# Patient Record
Sex: FEMALE | Race: WHITE | HISPANIC OR LATINO | URBAN - METROPOLITAN AREA
[De-identification: names, ages, dates, MRNs, and addresses within clinical notes are randomized per-mention and may not be internally consistent; named-entity substitution may affect disease eponyms.]

---

## 2017-05-01 ENCOUNTER — OUTPATIENT (OUTPATIENT)
Dept: OUTPATIENT SERVICES | Facility: HOSPITAL | Age: 82
LOS: 1 days | Discharge: HOME | End: 2017-05-01

## 2017-06-28 DIAGNOSIS — R05 COUGH: ICD-10-CM

## 2018-10-19 ENCOUNTER — RESULT REVIEW (OUTPATIENT)
Age: 83
End: 2018-10-19

## 2018-10-19 ENCOUNTER — OUTPATIENT (OUTPATIENT)
Dept: OUTPATIENT SERVICES | Facility: HOSPITAL | Age: 83
LOS: 1 days | Discharge: HOME | End: 2018-10-19

## 2018-10-19 VITALS — SYSTOLIC BLOOD PRESSURE: 153 MMHG | DIASTOLIC BLOOD PRESSURE: 76 MMHG | RESPIRATION RATE: 18 BRPM | HEART RATE: 71 BPM

## 2018-10-19 VITALS
WEIGHT: 139.99 LBS | RESPIRATION RATE: 18 BRPM | DIASTOLIC BLOOD PRESSURE: 93 MMHG | SYSTOLIC BLOOD PRESSURE: 177 MMHG | HEIGHT: 58 IN | HEART RATE: 65 BPM | TEMPERATURE: 98 F

## 2018-10-19 DIAGNOSIS — H44.513 ABSOLUTE GLAUCOMA, BILATERAL: Chronic | ICD-10-CM

## 2018-10-19 DIAGNOSIS — Z95.0 PRESENCE OF CARDIAC PACEMAKER: Chronic | ICD-10-CM

## 2018-10-19 DIAGNOSIS — Z90.49 ACQUIRED ABSENCE OF OTHER SPECIFIED PARTS OF DIGESTIVE TRACT: Chronic | ICD-10-CM

## 2018-10-19 DIAGNOSIS — Z86.79 PERSONAL HISTORY OF OTHER DISEASES OF THE CIRCULATORY SYSTEM: Chronic | ICD-10-CM

## 2018-10-19 DIAGNOSIS — H26.9 UNSPECIFIED CATARACT: Chronic | ICD-10-CM

## 2018-10-19 DIAGNOSIS — Z90.710 ACQUIRED ABSENCE OF BOTH CERVIX AND UTERUS: Chronic | ICD-10-CM

## 2018-10-19 RX ORDER — SODIUM CHLORIDE 9 MG/ML
1000 INJECTION, SOLUTION INTRAVENOUS
Qty: 0 | Refills: 0 | Status: DISCONTINUED | OUTPATIENT
Start: 2018-10-19 | End: 2018-11-03

## 2018-10-19 RX ORDER — ONDANSETRON 8 MG/1
4 TABLET, FILM COATED ORAL ONCE
Qty: 0 | Refills: 0 | Status: DISCONTINUED | OUTPATIENT
Start: 2018-10-19 | End: 2018-11-03

## 2018-10-19 NOTE — H&P ADULT - NSHPPHYSICALEXAM_GEN_ALL_CORE
Physical Exam  Gen: NAD  HEENT: NC/AT  Cardio: S1/S2   Resp: CTA B/L  Abdomen: Soft, ND/NT  Neuro: AAOx3

## 2018-10-19 NOTE — H&P ADULT - ASSESSMENT
Patient is an 85 y/o with PMHx of HTN, CAD, Asthma, that presents to [facility] for Endoscopic work up for some abdominal pain and decrease of appetite.   - Plan EGD with biopsies

## 2018-10-19 NOTE — H&P ADULT - HISTORY OF PRESENT ILLNESS
Patient is an 83 y/o with PMHx of HTN, CAD, Asthma, that presents to [facility] for Endoscopic work up for some abdominal pain and decrease of appetite.

## 2018-10-19 NOTE — CHART NOTE - NSCHARTNOTEFT_GEN_A_CORE
PACU ANESTHESIA ADMISSION NOTE      Procedure:   Post op diagnosis:      ____  Intubated  TV:______       Rate: ______      FiO2: ______    _x___  Patent Airway    _x___  Full return of protective reflexes    _x___  Full recovery from anesthesia / back to baseline status    Vitals:            T:                BP :     123/75           R:  20            Sat: 97              P: 77      Mental Status:  _x___ Awake   _____ Alert   _____ Drowsy   _____ Sedated    Nausea/Vomiting:  _x___  NO       ______Yes,   See Post - Op Orders         Pain Scale (0-10):  __0___    Treatment: _x___ None    ____ See Post - Op/PCA Orders    Post - Operative Fluids:   __x__ Oral   ____ See Post - Op Orders    Plan: Discharge:   _x___Home       _____Floor     _____Critical Care    _____  Other:_________________    Comments:  No anesthesia issues or complications noted.  Discharge when criteria met.

## 2018-10-19 NOTE — ASU PATIENT PROFILE, ADULT - PSH
Absolute glaucoma of both eyes    Cataract, bilateral    H/O abdominal hysterectomy    History of cardiac arrhythmia  stents  History of cholecystectomy    History of pacemaker

## 2018-10-19 NOTE — CHART NOTE - NSCHARTNOTEFT_GEN_A_CORE
84y Female    for EGD    Proventil (Other)      HPI:      PAST MEDICAL & SURGICAL HISTORY:  Asthma  CAD (coronary artery disease)  Hypothyroid  HTN (hypertension)  Cataract, bilateral  Absolute glaucoma of both eyes  H/O abdominal hysterectomy  History of cholecystectomy  History of cardiac arrhythmia: stents  History of pacemaker  Hard of Hearing      MEDICATIONS  (STANDING):    MEDICATIONS  (PRN):    Home Medications:  Advair Diskus 250 mcg-50 mcg inhalation powder: 1 puff(s) inhaled once a day (19 Oct 2018 08:41)  Aspir 81 oral delayed release tablet: 1 tab(s) orally once a day (19 Oct 2018 08:41)  Crestor 20 mg oral tablet: 1 tab(s) orally once a day (at bedtime) (19 Oct 2018 08:41)  metoprolol succinate 50 mg oral tablet, extended release: 1 tab(s) orally 2 times a day (19 Oct 2018 08:41)      Allergies    Proventil (Other)    Intolerances        SOCIAL HISTORY:    NSNDND    FAMILY HISTORY:      Vital Signs Last 24 Hrs  T(C): 36.6 (19 Oct 2018 08:42), Max: 36.6 (19 Oct 2018 08:42)  T(F): 97.9 (19 Oct 2018 08:42), Max: 97.9 (19 Oct 2018 08:42)  HR: 65 (19 Oct 2018 08:42) (65 - 65)  BP: 177/93 (19 Oct 2018 08:42) (177/93 - 177/93)  BP(mean): --  RR: 18 (19 Oct 2018 08:42) (18 - 18)  SpO2: 96    NPO: _x___ Yes  ____ No    Exam:  Drug Dosing Weight  Height (cm): 147.32 (19 Oct 2018 08:42)  Weight (kg): 63.5 (19 Oct 2018 08:42)  BMI (kg/m2): 29.3 (19 Oct 2018 08:42)  BSA (m2): 1.57 (19 Oct 2018 08:42)    MP: II  Teeth:  Mouth opening:    LABS:                    RADIOLOGY & ADDITIONAL STUDIES:      ASA _III___,  R/B/A MAC discussed with: __x__ patient, ____ guardian, Understand and Accepts,  Question Answered.

## 2018-10-23 LAB — SURGICAL PATHOLOGY STUDY: SIGNIFICANT CHANGE UP

## 2018-10-24 DIAGNOSIS — K29.50 UNSPECIFIED CHRONIC GASTRITIS WITHOUT BLEEDING: ICD-10-CM

## 2018-10-24 DIAGNOSIS — K29.80 DUODENITIS WITHOUT BLEEDING: ICD-10-CM

## 2018-10-24 DIAGNOSIS — R10.13 EPIGASTRIC PAIN: ICD-10-CM

## 2019-04-03 PROBLEM — E03.9 HYPOTHYROIDISM, UNSPECIFIED: Chronic | Status: ACTIVE | Noted: 2018-10-19

## 2019-04-03 PROBLEM — I10 ESSENTIAL (PRIMARY) HYPERTENSION: Chronic | Status: ACTIVE | Noted: 2018-10-19

## 2019-04-09 ENCOUNTER — APPOINTMENT (OUTPATIENT)
Dept: CARDIOTHORACIC SURGERY | Facility: CLINIC | Age: 84
End: 2019-04-09

## 2019-04-09 VITALS — BODY MASS INDEX: 28.22 KG/M2 | HEIGHT: 59 IN | WEIGHT: 140 LBS

## 2019-04-09 VITALS
SYSTOLIC BLOOD PRESSURE: 136 MMHG | RESPIRATION RATE: 14 BRPM | HEART RATE: 68 BPM | DIASTOLIC BLOOD PRESSURE: 76 MMHG | OXYGEN SATURATION: 92 %

## 2019-04-09 DIAGNOSIS — M79.603 PAIN IN ARM, UNSPECIFIED: ICD-10-CM

## 2019-04-09 DIAGNOSIS — Z86.39 PERSONAL HISTORY OF OTHER ENDOCRINE, NUTRITIONAL AND METABOLIC DISEASE: ICD-10-CM

## 2019-04-09 DIAGNOSIS — G89.29 PAIN IN ARM, UNSPECIFIED: ICD-10-CM

## 2019-04-09 DIAGNOSIS — Z87.09 PERSONAL HISTORY OF OTHER DISEASES OF THE RESPIRATORY SYSTEM: ICD-10-CM

## 2019-04-09 RX ORDER — METOPROLOL TARTRATE 50 MG/1
50 TABLET, FILM COATED ORAL
Refills: 0 | Status: COMPLETED | OUTPATIENT
Start: 2019-04-09

## 2019-04-09 RX ORDER — ROSUVASTATIN CALCIUM 20 MG/1
20 TABLET, FILM COATED ORAL
Refills: 0 | Status: COMPLETED | OUTPATIENT
Start: 2019-04-09

## 2019-04-09 RX ORDER — LEVOTHYROXINE SODIUM 75 UG/1
75 TABLET ORAL DAILY
Refills: 0 | Status: COMPLETED | OUTPATIENT
Start: 2019-04-09

## 2019-04-09 RX ORDER — FLUTICASONE PROPIONATE AND SALMETEROL 50; 100 UG/1; UG/1
100-50 POWDER RESPIRATORY (INHALATION)
Refills: 0 | Status: COMPLETED | OUTPATIENT
Start: 2019-04-09

## 2019-04-09 NOTE — REASON FOR VISIT
[Consultation] : a consultation visit [Family Member] : family member [FreeTextEntry1] : PPM- Dr Durbin, pt's cardio Dr. Moreau/ Dr. Lopez

## 2019-04-09 NOTE — HISTORY OF PRESENT ILLNESS
[Dyslipidemia] : Dyslipidemia [FreeTextEntry1] : Pt is 85y f, [place], with PPM ( sinus pauses) 9/21/2009 [facility],  CAD s/p PCI X 2 stent (2014?)  at [facility].  Battery at [facility] since feb. 27th, 2019.  PMHx of COPD/ Asthma ,hypothyroid, HLD. HTN. [Liver Disease] : no liver disease [Diabetes Mellitus] : no Diabetes Melllitus [Home Oxygen] : no home oxygen use [Cerebrovascular Disease] : no cerebrovascular disease [Unresponsive Neurologic State] : not in a unresponsive neurologic state [Prior Myocardial Infarction] : No prior myocardial infarction [Prior Heart Failure] : no prior heart failure

## 2019-04-11 ENCOUNTER — OUTPATIENT (OUTPATIENT)
Dept: OUTPATIENT SERVICES | Facility: HOSPITAL | Age: 84
LOS: 1 days | Discharge: HOME | End: 2019-04-11
Payer: MEDICARE

## 2019-04-11 VITALS
RESPIRATION RATE: 18 BRPM | OXYGEN SATURATION: 96 % | HEART RATE: 66 BPM | HEIGHT: 58 IN | WEIGHT: 137.35 LBS | TEMPERATURE: 99 F

## 2019-04-11 DIAGNOSIS — Z95.0 PRESENCE OF CARDIAC PACEMAKER: Chronic | ICD-10-CM

## 2019-04-11 DIAGNOSIS — T82.111A BREAKDOWN (MECHANICAL) OF CARDIAC PULSE GENERATOR (BATTERY), INITIAL ENCOUNTER: ICD-10-CM

## 2019-04-11 DIAGNOSIS — Z01.818 ENCOUNTER FOR OTHER PREPROCEDURAL EXAMINATION: ICD-10-CM

## 2019-04-11 DIAGNOSIS — Z86.79 PERSONAL HISTORY OF OTHER DISEASES OF THE CIRCULATORY SYSTEM: Chronic | ICD-10-CM

## 2019-04-11 DIAGNOSIS — H26.9 UNSPECIFIED CATARACT: Chronic | ICD-10-CM

## 2019-04-11 DIAGNOSIS — Z90.710 ACQUIRED ABSENCE OF BOTH CERVIX AND UTERUS: Chronic | ICD-10-CM

## 2019-04-11 DIAGNOSIS — H44.513 ABSOLUTE GLAUCOMA, BILATERAL: Chronic | ICD-10-CM

## 2019-04-11 DIAGNOSIS — Z90.49 ACQUIRED ABSENCE OF OTHER SPECIFIED PARTS OF DIGESTIVE TRACT: Chronic | ICD-10-CM

## 2019-04-11 LAB
ALBUMIN SERPL ELPH-MCNC: 4.4 G/DL — SIGNIFICANT CHANGE UP (ref 3.5–5.2)
ALP SERPL-CCNC: 104 U/L — SIGNIFICANT CHANGE UP (ref 30–115)
ALT FLD-CCNC: 9 U/L — SIGNIFICANT CHANGE UP (ref 0–41)
ANION GAP SERPL CALC-SCNC: 11 MMOL/L — SIGNIFICANT CHANGE UP (ref 7–14)
APTT BLD: 34.2 SEC — SIGNIFICANT CHANGE UP (ref 27–39.2)
AST SERPL-CCNC: 17 U/L — SIGNIFICANT CHANGE UP (ref 0–41)
BASOPHILS # BLD AUTO: 0.05 K/UL — SIGNIFICANT CHANGE UP (ref 0–0.2)
BASOPHILS NFR BLD AUTO: 0.6 % — SIGNIFICANT CHANGE UP (ref 0–1)
BILIRUB SERPL-MCNC: 0.3 MG/DL — SIGNIFICANT CHANGE UP (ref 0.2–1.2)
BUN SERPL-MCNC: 13 MG/DL — SIGNIFICANT CHANGE UP (ref 10–20)
CALCIUM SERPL-MCNC: 10 MG/DL — SIGNIFICANT CHANGE UP (ref 8.5–10.1)
CHLORIDE SERPL-SCNC: 102 MMOL/L — SIGNIFICANT CHANGE UP (ref 98–110)
CO2 SERPL-SCNC: 27 MMOL/L — SIGNIFICANT CHANGE UP (ref 17–32)
CREAT SERPL-MCNC: 0.8 MG/DL — SIGNIFICANT CHANGE UP (ref 0.7–1.5)
EOSINOPHIL # BLD AUTO: 0.71 K/UL — HIGH (ref 0–0.7)
EOSINOPHIL NFR BLD AUTO: 7.9 % — SIGNIFICANT CHANGE UP (ref 0–8)
GLUCOSE SERPL-MCNC: 104 MG/DL — HIGH (ref 70–99)
HCT VFR BLD CALC: 43.3 % — SIGNIFICANT CHANGE UP (ref 37–47)
HGB BLD-MCNC: 13.4 G/DL — SIGNIFICANT CHANGE UP (ref 12–16)
IMM GRANULOCYTES NFR BLD AUTO: 0.3 % — SIGNIFICANT CHANGE UP (ref 0.1–0.3)
INR BLD: 1.07 RATIO — SIGNIFICANT CHANGE UP (ref 0.65–1.3)
LYMPHOCYTES # BLD AUTO: 1.92 K/UL — SIGNIFICANT CHANGE UP (ref 1.2–3.4)
LYMPHOCYTES # BLD AUTO: 21.4 % — SIGNIFICANT CHANGE UP (ref 20.5–51.1)
MCHC RBC-ENTMCNC: 25.1 PG — LOW (ref 27–31)
MCHC RBC-ENTMCNC: 30.9 G/DL — LOW (ref 32–37)
MCV RBC AUTO: 81.1 FL — SIGNIFICANT CHANGE UP (ref 81–99)
MONOCYTES # BLD AUTO: 0.81 K/UL — HIGH (ref 0.1–0.6)
MONOCYTES NFR BLD AUTO: 9 % — SIGNIFICANT CHANGE UP (ref 1.7–9.3)
NEUTROPHILS # BLD AUTO: 5.47 K/UL — SIGNIFICANT CHANGE UP (ref 1.4–6.5)
NEUTROPHILS NFR BLD AUTO: 60.8 % — SIGNIFICANT CHANGE UP (ref 42.2–75.2)
NRBC # BLD: 0 /100 WBCS — SIGNIFICANT CHANGE UP (ref 0–0)
PLATELET # BLD AUTO: 244 K/UL — SIGNIFICANT CHANGE UP (ref 130–400)
POTASSIUM SERPL-MCNC: 4.9 MMOL/L — SIGNIFICANT CHANGE UP (ref 3.5–5)
POTASSIUM SERPL-SCNC: 4.9 MMOL/L — SIGNIFICANT CHANGE UP (ref 3.5–5)
PROT SERPL-MCNC: 7.2 G/DL — SIGNIFICANT CHANGE UP (ref 6–8)
PROTHROM AB SERPL-ACNC: 12.3 SEC — SIGNIFICANT CHANGE UP (ref 9.95–12.87)
RBC # BLD: 5.34 M/UL — SIGNIFICANT CHANGE UP (ref 4.2–5.4)
RBC # FLD: 14.8 % — HIGH (ref 11.5–14.5)
SODIUM SERPL-SCNC: 140 MMOL/L — SIGNIFICANT CHANGE UP (ref 135–146)
WBC # BLD: 8.99 K/UL — SIGNIFICANT CHANGE UP (ref 4.8–10.8)
WBC # FLD AUTO: 8.99 K/UL — SIGNIFICANT CHANGE UP (ref 4.8–10.8)

## 2019-04-11 PROCEDURE — 71046 X-RAY EXAM CHEST 2 VIEWS: CPT | Mod: 26

## 2019-04-11 PROCEDURE — 93010 ELECTROCARDIOGRAM REPORT: CPT

## 2019-04-11 NOTE — H&P PST ADULT - HISTORY OF PRESENT ILLNESS
CURRENTLY  DENIES ANY CP, PALPITATIONS,COUGH OR DYSURIA  EXERCISE TOLERANCE LIMITED TO LESS THAN 1/2 BLOCK WITHOUT SOB    AS PER PATIENT  this is his/her complete medical history including medications - PRESCRIPTIONS  OVER THE COUNTER MEDS    PT STATES SHE IS ALWAYS SHORT OF BREATH- USES ADVAIR THAT HELPS HER SYMPTOMS  HAD RECENT THROAT AND RIGHT EAR INFECTION- COMPLETED ANTIBIOTICS 2 WEEKS AGO

## 2019-04-11 NOTE — H&P PST ADULT - NSICDXPASTSURGICALHX_GEN_ALL_CORE_FT
PAST SURGICAL HISTORY:  Absolute glaucoma of both eyes     Cataract, bilateral     H/O abdominal hysterectomy     History of cardiac arrhythmia stents    History of cholecystectomy     History of pacemaker

## 2019-04-11 NOTE — H&P PST ADULT - REASON FOR ADMISSION
86 Y/O F SCHEDULED FOR PAST FOR PACEMAKER BATTERY CHANGE ON 4/15/19  PACEMAKER PLACED 9 YEARS AGO- PT STATES HER HEART "STOPPED 4 TIMES".

## 2019-04-11 NOTE — H&P PST ADULT - NSICDXPASTMEDICALHX_GEN_ALL_CORE_FT
PAST MEDICAL HISTORY:  Asthma LAST ATTACK YEARS AGO    CAD (coronary artery disease) STENTS X 2    High cholesterol     HTN (hypertension)     Hypothyroid     Vertigo CAN NOT LAY FLAT

## 2019-04-15 ENCOUNTER — OUTPATIENT (OUTPATIENT)
Dept: OUTPATIENT SERVICES | Facility: HOSPITAL | Age: 84
LOS: 1 days | Discharge: HOME | End: 2019-04-15

## 2019-04-15 VITALS
TEMPERATURE: 98 F | HEIGHT: 58 IN | RESPIRATION RATE: 18 BRPM | SYSTOLIC BLOOD PRESSURE: 155 MMHG | HEART RATE: 56 BPM | WEIGHT: 145.06 LBS | DIASTOLIC BLOOD PRESSURE: 67 MMHG

## 2019-04-15 VITALS — RESPIRATION RATE: 18 BRPM | DIASTOLIC BLOOD PRESSURE: 79 MMHG | SYSTOLIC BLOOD PRESSURE: 160 MMHG | HEART RATE: 61 BPM

## 2019-04-15 DIAGNOSIS — Z90.710 ACQUIRED ABSENCE OF BOTH CERVIX AND UTERUS: Chronic | ICD-10-CM

## 2019-04-15 DIAGNOSIS — H44.513 ABSOLUTE GLAUCOMA, BILATERAL: Chronic | ICD-10-CM

## 2019-04-15 DIAGNOSIS — H26.9 UNSPECIFIED CATARACT: Chronic | ICD-10-CM

## 2019-04-15 DIAGNOSIS — Z95.0 PRESENCE OF CARDIAC PACEMAKER: Chronic | ICD-10-CM

## 2019-04-15 DIAGNOSIS — Z86.79 PERSONAL HISTORY OF OTHER DISEASES OF THE CIRCULATORY SYSTEM: Chronic | ICD-10-CM

## 2019-04-15 DIAGNOSIS — Z90.49 ACQUIRED ABSENCE OF OTHER SPECIFIED PARTS OF DIGESTIVE TRACT: Chronic | ICD-10-CM

## 2019-04-15 DIAGNOSIS — I10 ESSENTIAL (PRIMARY) HYPERTENSION: ICD-10-CM

## 2019-04-15 DIAGNOSIS — I25.10 ATHEROSCLEROTIC HEART DISEASE OF NATIVE CORONARY ARTERY WITHOUT ANGINA PECTORIS: ICD-10-CM

## 2019-04-15 DIAGNOSIS — J45.909 UNSPECIFIED ASTHMA, UNCOMPLICATED: ICD-10-CM

## 2019-04-15 PROBLEM — R42 DIZZINESS AND GIDDINESS: Chronic | Status: ACTIVE | Noted: 2019-04-11

## 2019-04-15 PROBLEM — E78.00 PURE HYPERCHOLESTEROLEMIA, UNSPECIFIED: Chronic | Status: ACTIVE | Noted: 2019-04-11

## 2019-04-15 NOTE — ASU DISCHARGE PLAN (ADULT/PEDIATRIC) - CALL YOUR DOCTOR IF YOU HAVE ANY OF THE FOLLOWING:
Excessive diarrhea/Pain not relieved by Medications/Increased irritability or sluggishness/Fever greater than (need to indicate Fahrenheit or Celsius)/Wound/Surgical Site with redness, or foul smelling discharge or pus/Numbness, tingling, color or temperature change to extremity/Nausea and vomiting that does not stop/Inability to tolerate liquids or foods/Bleeding that does not stop/Swelling that gets worse/Unable to urinate

## 2019-04-15 NOTE — ASU DISCHARGE PLAN (ADULT/PEDIATRIC) - CARE PROVIDER_API CALL
Javon Harley)  Surgery; Thoracic and Cardiac Surgery  [address] [place] [address], [facility] [address]  [place] [address]  Phone: [phone]  Fax: [phone]  Follow Up Time: 1 week

## 2019-04-15 NOTE — PRE-ANESTHESIA EVALUATION ADULT - NSANTHOSAYNRD_GEN_A_CORE
No. ALIS screening performed.  STOP BANG Legend: 0-2 = LOW Risk; 3-4 = INTERMEDIATE Risk; 5-8 = HIGH Risk/SEE TOOL

## 2019-04-15 NOTE — ASU DISCHARGE PLAN (ADULT/PEDIATRIC) - FREQUENT HAND WASHING PREVENTS THE SPREAD OF INFECTION.
Start erythromycin ophthalmic ointment 3 X a day for one week to both upper lids. Statement Selected

## 2019-04-15 NOTE — ASU PATIENT PROFILE, ADULT - PMH
Asthma  LAST ATTACK YEARS AGO  CAD (coronary artery disease)  STENTS X 2  High cholesterol    HTN (hypertension)    Hypothyroid    Vertigo  CAN NOT LAY FLAT

## 2019-04-23 ENCOUNTER — APPOINTMENT (OUTPATIENT)
Dept: CARDIOTHORACIC SURGERY | Facility: CLINIC | Age: 84
End: 2019-04-23

## 2019-04-23 VITALS
TEMPERATURE: 97.3 F | WEIGHT: 140 LBS | HEART RATE: 68 BPM | DIASTOLIC BLOOD PRESSURE: 90 MMHG | BODY MASS INDEX: 28.22 KG/M2 | RESPIRATION RATE: 12 BRPM | HEIGHT: 59 IN | SYSTOLIC BLOOD PRESSURE: 170 MMHG | OXYGEN SATURATION: 73 %

## 2019-04-25 DIAGNOSIS — I25.10 ATHEROSCLEROTIC HEART DISEASE OF NATIVE CORONARY ARTERY WITHOUT ANGINA PECTORIS: ICD-10-CM

## 2019-04-25 DIAGNOSIS — Z90.710 ACQUIRED ABSENCE OF BOTH CERVIX AND UTERUS: ICD-10-CM

## 2019-04-25 DIAGNOSIS — E03.9 HYPOTHYROIDISM, UNSPECIFIED: ICD-10-CM

## 2019-04-25 DIAGNOSIS — Z90.49 ACQUIRED ABSENCE OF OTHER SPECIFIED PARTS OF DIGESTIVE TRACT: ICD-10-CM

## 2019-04-25 DIAGNOSIS — H26.9 UNSPECIFIED CATARACT: ICD-10-CM

## 2019-04-25 DIAGNOSIS — J45.909 UNSPECIFIED ASTHMA, UNCOMPLICATED: ICD-10-CM

## 2019-04-25 DIAGNOSIS — Z88.8 ALLERGY STATUS TO OTHER DRUGS, MEDICAMENTS AND BIOLOGICAL SUBSTANCES: ICD-10-CM

## 2019-04-25 DIAGNOSIS — Z79.82 LONG TERM (CURRENT) USE OF ASPIRIN: ICD-10-CM

## 2019-04-25 DIAGNOSIS — Z45.010 ENCOUNTER FOR CHECKING AND TESTING OF CARDIAC PACEMAKER PULSE GENERATOR [BATTERY]: ICD-10-CM

## 2019-04-25 DIAGNOSIS — Z95.5 PRESENCE OF CORONARY ANGIOPLASTY IMPLANT AND GRAFT: ICD-10-CM

## 2019-04-25 DIAGNOSIS — H44.513 ABSOLUTE GLAUCOMA, BILATERAL: ICD-10-CM

## 2019-04-25 DIAGNOSIS — I49.9 CARDIAC ARRHYTHMIA, UNSPECIFIED: ICD-10-CM

## 2019-04-25 DIAGNOSIS — I10 ESSENTIAL (PRIMARY) HYPERTENSION: ICD-10-CM

## 2019-04-25 DIAGNOSIS — E78.00 PURE HYPERCHOLESTEROLEMIA, UNSPECIFIED: ICD-10-CM

## 2019-05-08 ENCOUNTER — OUTPATIENT (OUTPATIENT)
Dept: OUTPATIENT SERVICES | Facility: HOSPITAL | Age: 84
LOS: 1 days | Discharge: HOME | End: 2019-05-08
Payer: MEDICARE

## 2019-05-08 DIAGNOSIS — M79.621 PAIN IN RIGHT UPPER ARM: ICD-10-CM

## 2019-05-08 DIAGNOSIS — Z86.79 PERSONAL HISTORY OF OTHER DISEASES OF THE CIRCULATORY SYSTEM: Chronic | ICD-10-CM

## 2019-05-08 DIAGNOSIS — Z95.0 PRESENCE OF CARDIAC PACEMAKER: Chronic | ICD-10-CM

## 2019-05-08 DIAGNOSIS — Z90.710 ACQUIRED ABSENCE OF BOTH CERVIX AND UTERUS: Chronic | ICD-10-CM

## 2019-05-08 DIAGNOSIS — H26.9 UNSPECIFIED CATARACT: Chronic | ICD-10-CM

## 2019-05-08 DIAGNOSIS — Z90.49 ACQUIRED ABSENCE OF OTHER SPECIFIED PARTS OF DIGESTIVE TRACT: Chronic | ICD-10-CM

## 2019-05-08 DIAGNOSIS — H44.513 ABSOLUTE GLAUCOMA, BILATERAL: Chronic | ICD-10-CM

## 2019-05-08 DIAGNOSIS — M25.511 PAIN IN RIGHT SHOULDER: ICD-10-CM

## 2019-05-08 PROCEDURE — 73201 CT UPPER EXTREMITY W/DYE: CPT | Mod: 26,RT

## 2019-08-05 ENCOUNTER — APPOINTMENT (OUTPATIENT)
Dept: CARDIOLOGY | Facility: CLINIC | Age: 84
End: 2019-08-05

## 2020-11-12 ENCOUNTER — TRANSCRIPTION ENCOUNTER (OUTPATIENT)
Age: 85
End: 2020-11-12

## 2021-01-11 ENCOUNTER — OUTPATIENT (OUTPATIENT)
Dept: OUTPATIENT SERVICES | Facility: HOSPITAL | Age: 86
LOS: 1 days | Discharge: HOME | End: 2021-01-11
Payer: MEDICARE

## 2021-01-11 DIAGNOSIS — Z95.0 PRESENCE OF CARDIAC PACEMAKER: Chronic | ICD-10-CM

## 2021-01-11 DIAGNOSIS — M79.673 PAIN IN UNSPECIFIED FOOT: ICD-10-CM

## 2021-01-11 DIAGNOSIS — Z90.710 ACQUIRED ABSENCE OF BOTH CERVIX AND UTERUS: Chronic | ICD-10-CM

## 2021-01-11 DIAGNOSIS — H26.9 UNSPECIFIED CATARACT: Chronic | ICD-10-CM

## 2021-01-11 DIAGNOSIS — Z90.49 ACQUIRED ABSENCE OF OTHER SPECIFIED PARTS OF DIGESTIVE TRACT: Chronic | ICD-10-CM

## 2021-01-11 DIAGNOSIS — Z86.79 PERSONAL HISTORY OF OTHER DISEASES OF THE CIRCULATORY SYSTEM: Chronic | ICD-10-CM

## 2021-01-11 DIAGNOSIS — H44.513 ABSOLUTE GLAUCOMA, BILATERAL: Chronic | ICD-10-CM

## 2021-01-11 PROCEDURE — 73630 X-RAY EXAM OF FOOT: CPT | Mod: 26,50

## 2021-01-19 ENCOUNTER — OUTPATIENT (OUTPATIENT)
Dept: OUTPATIENT SERVICES | Facility: HOSPITAL | Age: 86
LOS: 1 days | Discharge: HOME | End: 2021-01-19
Payer: MEDICARE

## 2021-01-19 DIAGNOSIS — H44.513 ABSOLUTE GLAUCOMA, BILATERAL: Chronic | ICD-10-CM

## 2021-01-19 DIAGNOSIS — Z90.710 ACQUIRED ABSENCE OF BOTH CERVIX AND UTERUS: Chronic | ICD-10-CM

## 2021-01-19 DIAGNOSIS — H26.9 UNSPECIFIED CATARACT: Chronic | ICD-10-CM

## 2021-01-19 DIAGNOSIS — Z90.49 ACQUIRED ABSENCE OF OTHER SPECIFIED PARTS OF DIGESTIVE TRACT: Chronic | ICD-10-CM

## 2021-01-19 DIAGNOSIS — M79.671 PAIN IN RIGHT FOOT: ICD-10-CM

## 2021-01-19 DIAGNOSIS — Z86.79 PERSONAL HISTORY OF OTHER DISEASES OF THE CIRCULATORY SYSTEM: Chronic | ICD-10-CM

## 2021-01-19 DIAGNOSIS — Z95.0 PRESENCE OF CARDIAC PACEMAKER: Chronic | ICD-10-CM

## 2021-01-19 PROCEDURE — 73630 X-RAY EXAM OF FOOT: CPT | Mod: 26,RT

## 2021-05-19 ENCOUNTER — APPOINTMENT (OUTPATIENT)
Dept: CARDIOLOGY | Facility: CLINIC | Age: 86
End: 2021-05-19
Payer: MEDICARE

## 2021-05-19 VITALS
WEIGHT: 135 LBS | BODY MASS INDEX: 27.21 KG/M2 | DIASTOLIC BLOOD PRESSURE: 80 MMHG | OXYGEN SATURATION: 96 % | TEMPERATURE: 98 F | SYSTOLIC BLOOD PRESSURE: 149 MMHG | HEIGHT: 59 IN | HEART RATE: 69 BPM

## 2021-05-19 DIAGNOSIS — H91.90 UNSPECIFIED HEARING LOSS, UNSPECIFIED EAR: ICD-10-CM

## 2021-05-19 DIAGNOSIS — Z45.010 ENCOUNTER FOR CHECKING AND TESTING OF CARDIAC PACEMAKER PULSE GENERATOR [BATTERY]: ICD-10-CM

## 2021-05-19 DIAGNOSIS — E03.9 HYPOTHYROIDISM, UNSPECIFIED: ICD-10-CM

## 2021-05-19 PROCEDURE — 93000 ELECTROCARDIOGRAM COMPLETE: CPT | Mod: 59

## 2021-05-19 PROCEDURE — 93280 PM DEVICE PROGR EVAL DUAL: CPT

## 2021-05-19 PROCEDURE — 99214 OFFICE O/P EST MOD 30 MIN: CPT | Mod: 25

## 2021-05-19 NOTE — ASSESSMENT
[FreeTextEntry1] : 88 yo F wit history of sinus node dysfunction s/p DC PPM and HTN here to establish care in our device clinic. \par \par # Sinus node dysfunction s/p DC PPM\par - Normal functioning PPM. No events. \par - Pt does not want remote monitor.\par \par # HTN\par - BP elevated but pt admits to dietary noncompliance (ate a hotdog for dinner)\par - 2g Na diet enforced\par - Follow up with cardiologist \par \par I have also advised the patient to go to the nearest emergency room if she experiences any chest pain, dyspnea, syncope, or has any other compelling symptoms.\par \par Follow up in 4-6 mo

## 2021-05-19 NOTE — PROCEDURE
[No] : not [NSR] : normal sinus rhythm [See Device Printout] : See device printout [Pacemaker] : pacemaker [DDD] : DDD [Voltage: ___ volts] : Voltage was [unfilled] volts [Magnet Rate: ___ Ppm] : magnet rate was [unfilled] Ppm [Longevity: ___ months] : The estimated remaining battery life is [unfilled] months [Lead Imp:  ___ohms] : lead impedance was [unfilled] ohms [Sensing Amplitude ___mv] : sensing amplitude was [unfilled] mv [___V @] : [unfilled] V [___ ms] : [unfilled] ms [Programmed for Longevity] : output reprogrammed for improved battery longevity [de-identified] : [employer] [de-identified] : PM 1574 [de-identified] : [id number] [de-identified] : 4/16/19 [de-identified] : 55 [de-identified] : AutoCapture turned on in the RV [de-identified] : AP 21%\par  <1%\par Patient does not want remote transmitter\par AutoCapture turned on the in the RV\par Normal device function\par No events

## 2021-05-19 NOTE — PHYSICAL EXAM
[General Appearance - Well Developed] : well developed [Normal Appearance] : normal appearance [Well Groomed] : well groomed [General Appearance - Well Nourished] : well nourished [No Deformities] : no deformities [General Appearance - In No Acute Distress] : no acute distress [Heart Rate And Rhythm] : heart rate and rhythm were normal [Heart Sounds] : normal S1 and S2 [Murmurs] : no murmurs present [Edema] : no peripheral edema present [] : no respiratory distress [Respiration, Rhythm And Depth] : normal respiratory rhythm and effort [Exaggerated Use Of Accessory Muscles For Inspiration] : no accessory muscle use [Auscultation Breath Sounds / Voice Sounds] : lungs were clear to auscultation bilaterally [Left Infraclavicular] : left infraclavicular area [Clean] : clean [Dry] : dry [Well-Healed] : well-healed [Abdomen Soft] : soft [Nail Clubbing] : no clubbing of the fingernails

## 2021-05-19 NOTE — HISTORY OF PRESENT ILLNESS
[de-identified] : \par Cardiologist: Dr. Marrero\par \par 88 yo F with history of sinus pauses s/p [facility] [facility] DC PPM (implanted in [place] 9/2009, s/p gen change 4/15/2019 by Dr. Harley), CAD with PCI ([facility], 2014), COPD, asthma, HTN, HL, hypothyroidism here to establish care in our device clinic. She has not seen anyone from EP in many years. The patient denies any cardiovascular complaints including chest pain, dyspnea, palpitations, dizziness, lightheadedness, presyncope or syncope.

## 2021-11-24 ENCOUNTER — APPOINTMENT (OUTPATIENT)
Dept: CARDIOLOGY | Facility: CLINIC | Age: 86
End: 2021-11-24
Payer: MEDICARE

## 2021-11-24 VITALS
WEIGHT: 134 LBS | SYSTOLIC BLOOD PRESSURE: 152 MMHG | DIASTOLIC BLOOD PRESSURE: 76 MMHG | TEMPERATURE: 97.5 F | BODY MASS INDEX: 27.01 KG/M2 | HEIGHT: 59 IN | HEART RATE: 61 BPM | RESPIRATION RATE: 16 BRPM

## 2021-11-24 PROCEDURE — 93280 PM DEVICE PROGR EVAL DUAL: CPT

## 2021-11-24 NOTE — HISTORY OF PRESENT ILLNESS
[de-identified] : \par Cardiologist: Dr. Marrero\par \par 86 yo F with history of sinus pauses s/p [facility] [facility] DC PPM (implanted in [place] 9/2009, s/p gen change on 4/15/2019 by Dr. Harley), CAD with PCI ([facility], 2014), COPD, asthma, HTN, HL, hypothyroidism. \par \par \par  The patient denies any cardiovascular complaints including chest pain, dyspnea, palpitations, dizziness, lightheadedness, presyncope or syncope. \par \par Presents with her son for routine device interrogation.\par ECG ( 11/24/2021)  58bpm sinus abby HI 214ms QRS 82ms\par ECHO (11/4/2021) EF 50-55%

## 2021-11-24 NOTE — HISTORY OF PRESENT ILLNESS
[de-identified] : \par Cardiologist: Dr. Marrero\par \par 86 yo F with history of sinus pauses s/p [facility] [facility] DC PPM (implanted in [place] 9/2009, s/p gen change on 4/15/2019 by Dr. Harley), CAD with PCI ([facility], 2014), COPD, asthma, HTN, HL, hypothyroidism. \par \par \par  The patient denies any cardiovascular complaints including chest pain, dyspnea, palpitations, dizziness, lightheadedness, presyncope or syncope. \par \par Presents with her son for routine device interrogation.\par ECG ( 11/24/2021)  58bpm sinus abby MT 214ms QRS 82ms\par ECHO (11/4/2021) EF 50-55%

## 2021-11-24 NOTE — ASSESSMENT
[FreeTextEntry1] : # Sinus node dysfunction s/p PPM implant in 2019\par Routine dual chamber pacemaker interrogation\par Stable parameter\par No events\par \par Patient refuse remote monitoring. Given one before but never plugged it as per her son.\par RTO in 6 months

## 2021-11-24 NOTE — PROCEDURE
[No] : not [See Device Printout] : See device printout [Pacemaker] : pacemaker [DDD] : DDD [Voltage: ___ volts] : Voltage was [unfilled] volts [Magnet Rate: ___ Ppm] : magnet rate was [unfilled] Ppm [Longevity: ___ months] : The estimated remaining battery life is [unfilled] months [___V @] : [unfilled] V [___ ms] : [unfilled] ms [Programmed for Longevity] : output reprogrammed for improved battery longevity [Sinus Bradycardia] : sinus bradycardia [Sensing Amplitude ___mv] : sensing amplitude was [unfilled] mv [Lead Imp:  ___ohms] : lead impedance was [unfilled] ohms [de-identified] : Abbott  Assurity MTI [de-identified] : PM 7784 [de-identified] : [id number] [de-identified] : 4/16/19 [de-identified] : 55 [de-identified] : AutoCapture turned on in the RV [de-identified] : AP 22%\par  <1%\par Patient does not want remote transmitter\par AutoCapture turned on the in the RV\par Normal device function\par No events

## 2021-11-24 NOTE — PROCEDURE
[No] : not [See Device Printout] : See device printout [Pacemaker] : pacemaker [DDD] : DDD [Voltage: ___ volts] : Voltage was [unfilled] volts [Magnet Rate: ___ Ppm] : magnet rate was [unfilled] Ppm [Longevity: ___ months] : The estimated remaining battery life is [unfilled] months [___V @] : [unfilled] V [___ ms] : [unfilled] ms [Programmed for Longevity] : output reprogrammed for improved battery longevity [Sinus Bradycardia] : sinus bradycardia [Sensing Amplitude ___mv] : sensing amplitude was [unfilled] mv [Lead Imp:  ___ohms] : lead impedance was [unfilled] ohms [de-identified] : Abbott  Assurity MTI [de-identified] : PM 2078 [de-identified] : [id number] [de-identified] : 4/16/19 [de-identified] : 55 [de-identified] : AutoCapture turned on in the RV [de-identified] : AP 22%\par  <1%\par Patient does not want remote transmitter\par AutoCapture turned on the in the RV\par Normal device function\par No events

## 2021-12-06 ENCOUNTER — TRANSCRIPTION ENCOUNTER (OUTPATIENT)
Age: 86
End: 2021-12-06

## 2022-05-11 ENCOUNTER — APPOINTMENT (OUTPATIENT)
Dept: CARDIOLOGY | Facility: CLINIC | Age: 87
End: 2022-05-11
Payer: MEDICARE

## 2022-05-11 VITALS
HEART RATE: 55 BPM | HEIGHT: 59 IN | TEMPERATURE: 97.8 F | DIASTOLIC BLOOD PRESSURE: 77 MMHG | BODY MASS INDEX: 27.21 KG/M2 | SYSTOLIC BLOOD PRESSURE: 177 MMHG | WEIGHT: 135 LBS

## 2022-05-11 PROCEDURE — 93000 ELECTROCARDIOGRAM COMPLETE: CPT | Mod: 59

## 2022-05-11 PROCEDURE — 93280 PM DEVICE PROGR EVAL DUAL: CPT

## 2022-05-11 NOTE — PROCEDURE
[No] : not [Sinus Bradycardia] : sinus bradycardia [See Device Printout] : See device printout [Pacemaker] : pacemaker [DDD] : DDD [Voltage: ___ volts] : Voltage was [unfilled] volts [Magnet Rate: ___ Ppm] : magnet rate was [unfilled] Ppm [___V @] : [unfilled] V [___ ms] : [unfilled] ms [Programmed for Longevity] : output reprogrammed for improved battery longevity [Longevity: ___ months] : The estimated remaining battery life is [unfilled] months [Threshold Testing Performed] : Threshold testing was performed [Sensing Amplitude ___mv] : sensing amplitude was [unfilled] mv [Lead Imp:  ___ohms] : lead impedance was [unfilled] ohms [de-identified] : Abbott  Assurity MTI [de-identified] : PM 6138 [de-identified] : [id number] [de-identified] : 4/16/19 [de-identified] : 55 [de-identified] : AutoCapture turned on in the RV [de-identified] : AP 21%\par  <1%\par Patient does not want remote transmitter\par AutoCapture turned on the in the RV\par Normal device function\par No events

## 2022-05-11 NOTE — HISTORY OF PRESENT ILLNESS
[de-identified] : \par Cardiologist: Dr. Marrero\par \par 87 yo F with history of sinus pauses s/p [facility] [facility] DC PPM (implanted in [place] 9/2009, s/p gen change on 4/15/2019 by Dr. Harley), CAD with PCI ([facility], 2014), COPD, asthma, HTN, HL, hypothyroidism. \par \par \par  The patient denies any cardiovascular complaints including chest pain, dyspnea, palpitations, dizziness, lightheadedness, presyncope or syncope. \par \par Presents with her son for routine device interrogation.\par ECG ( 5/11/2022) 55 bpm A paced WI 220ms, QRS 86ms, QTC 420ms\par ECG ( 11/24/2021)  58bpm sinus abby WI 214ms QRS 82ms\par ECHO (11/4/2021) EF 50-55%

## 2022-05-11 NOTE — ASSESSMENT
[FreeTextEntry1] : # Sinus node dysfunction s/p PPM implant in 2019\par Routine dual chamber pacemaker interrogation\par Stable parameter\par No events\par \par Patient refused remote monitoring services. Prefers to come to the office\par \par HTN-\par no hypertensive med- will see cards next month\par Patient has not taken her metoprolol yet\par BW was done in [facility] - will follow\par \par RTO in 6 months

## 2022-10-11 ENCOUNTER — APPOINTMENT (OUTPATIENT)
Dept: CARDIOLOGY | Facility: CLINIC | Age: 87
End: 2022-10-11

## 2022-10-11 VITALS — BODY MASS INDEX: 26.08 KG/M2 | HEIGHT: 59 IN | WEIGHT: 129.38 LBS

## 2022-10-11 DIAGNOSIS — I73.9 PERIPHERAL VASCULAR DISEASE, UNSPECIFIED: ICD-10-CM

## 2022-10-11 PROCEDURE — 99215 OFFICE O/P EST HI 40 MIN: CPT

## 2022-10-12 ENCOUNTER — NON-APPOINTMENT (OUTPATIENT)
Age: 87
End: 2022-10-12

## 2022-10-12 DIAGNOSIS — Z82.49 FAMILY HISTORY OF ISCHEMIC HEART DISEASE AND OTHER DISEASES OF THE CIRCULATORY SYSTEM: ICD-10-CM

## 2022-10-17 ENCOUNTER — APPOINTMENT (OUTPATIENT)
Dept: CARDIOLOGY | Facility: CLINIC | Age: 87
End: 2022-10-17

## 2022-10-17 PROCEDURE — 93306 TTE W/DOPPLER COMPLETE: CPT

## 2022-11-23 ENCOUNTER — APPOINTMENT (OUTPATIENT)
Dept: CARDIOLOGY | Facility: CLINIC | Age: 87
End: 2022-11-23

## 2022-11-23 VITALS
SYSTOLIC BLOOD PRESSURE: 134 MMHG | HEART RATE: 72 BPM | HEIGHT: 62 IN | DIASTOLIC BLOOD PRESSURE: 71 MMHG | WEIGHT: 130 LBS | BODY MASS INDEX: 23.92 KG/M2 | TEMPERATURE: 97.2 F

## 2022-11-23 PROCEDURE — 93000 ELECTROCARDIOGRAM COMPLETE: CPT | Mod: 59

## 2022-11-23 PROCEDURE — 93280 PM DEVICE PROGR EVAL DUAL: CPT

## 2022-11-23 RX ORDER — ROSUVASTATIN CALCIUM 20 MG/1
20 TABLET, FILM COATED ORAL
Refills: 0 | Status: COMPLETED | COMMUNITY
End: 2022-11-23

## 2022-11-23 RX ORDER — AMLODIPINE BESYLATE 5 MG/1
5 TABLET ORAL
Refills: 0 | Status: COMPLETED | COMMUNITY
End: 2022-11-23

## 2022-11-23 NOTE — CARDIOLOGY SUMMARY
[de-identified] : ECG ( 11/23/2022) 72 bpm MI 192ms, QRS 86ms, QTC 418ms\par ECG ( 11/24/2021)  58bpm sinus abby MI 214ms QRS 82ms [de-identified] : Echo ( 10/17/2022)  EF 55%\par ECHO (11/4/2021) EF 50-55%

## 2022-11-23 NOTE — PROCEDURE
[No] : not [Sinus Bradycardia] : sinus bradycardia [See Device Printout] : See device printout [Pacemaker] : pacemaker [DDD] : DDD [Voltage: ___ volts] : Voltage was [unfilled] volts [Magnet Rate: ___ Ppm] : magnet rate was [unfilled] Ppm [___ ms] : [unfilled] ms [Programmed for Longevity] : output reprogrammed for improved battery longevity [Longevity: ___ months] : The estimated remaining battery life is [unfilled] months [Lead Imp:  ___ohms] : lead impedance was [unfilled] ohms [Sensing Amplitude ___mv] : sensing amplitude was [unfilled] mv [___V @] : [unfilled] V [de-identified] : Abbott  Assurity MTI [de-identified] : PM 2522 [de-identified] : [id number] [de-identified] : 4/16/19 [de-identified] : 55 [de-identified] : AutoCapture turned on in the RV [de-identified] : AP 16%\par  <1%\par Patient does not want remote transmitter\par AutoCapture turned on the in the RV\par Normal device function\par No events

## 2022-11-23 NOTE — ASSESSMENT
[FreeTextEntry1] : \par # Sinus node dysfunction s/p PPM implant in 2019\par Routine dual chamber pacemaker interrogation\par Stable parameter. Not pace make dependent\par No events\par \par continue metoprolol succ 50mg twice daily\par \par Patient refuse remote monitoring. Given one before but never plugged it as per her son.\par RTO in 6 months

## 2022-11-23 NOTE — HISTORY OF PRESENT ILLNESS
[de-identified] : \par Cardiologist: Dr. Marrero\par \par 87 yo F with history of sinus pauses s/p [employer] [facility] DC PPM (implanted in [place] 9/2009, s/p gen change on 4/15/2019 by Dr. Harley), CAD with PCI ([facility], 2014), COPD, asthma, HTN, HL, hypothyroidism. \par \par \par  The patient denies any cardiovascular complaints including chest pain, dyspnea, palpitations, dizziness, lightheadedness, presyncope or syncope. \par \par Presents with her son for routine device interrogation.\par

## 2022-12-13 RX ORDER — METOPROLOL SUCCINATE 50 MG/1
50 TABLET, EXTENDED RELEASE ORAL
Qty: 90 | Refills: 3 | Status: DISCONTINUED | COMMUNITY
Start: 1900-01-01 | End: 2022-12-13

## 2023-02-07 NOTE — ASU PREOP CHECKLIST - LATEX ALLERGY
[facility]  Progress Note Ching yDson 1950, 67 y o  female MRN: [id number]  Unit/Bed#: [facility] 264-39 Encounter: [id number]  Primary Care Provider: No primary care provider on file  Date and time admitted to hospital: 1/23/2023  1:33 PM    Adjustment disorder with anxiety  Assessment & Plan  Has been receiving Atarax 10mg Q6 PRN since 1/26  Discussed with patient and admits to feelings of anxiety prior to her fall  Started Lexapro 5mg daily on 1/30  Supportive counseling as needed  Neuropsych consulted  Follow-up with PCP as outpatient  Hypoxemia  Assessment & Plan  Hypoxia in acute setting post-operatively  Developed PEs and was started on Eliquis  Per Pulmonary - recommending overnight noc ox as outpatient  Desat while sleeping - noc ox obtained on 1/27  Desat for 1 hour and 40 minutes, lowest saturation 80%  Apply 2L O2 at night or while sleeping  Repeat overnight noc ox prior to discharge for DME evaluation  Osteopenia  Assessment & Plan  DXA scan in 12/2020 showed osteopenia  Continue home calcium carbonate and Vitamin D supplementation  Vitamin D level 29 8 on 1/24  Increased Vitamin D3 to 3000u daily  Recommend repeat DXA scan as outpatient along with discussion about Prolia injections  Follow-up with PCP as outpatient  Acute postoperative anemia due to expected blood loss  Assessment & Plan  Hgb currently 9 1  Hgb was 12 6 pre-operatively  Iron panel on 1/23: Iron Saturation 9%, TIBC 279, Iron 25, and Ferritin 81   2 doses of IV Venofer from 1/24-1/25  Given 1u PRBCs on 1/26 for Hgb of 6 9  No active s/s of bleeding  Transfuse for Hgb <7 0 or if becomes symptomatic  Continue to trend CBC  Acute deep vein thrombosis (DVT) of left peroneal vein [facility])  Assessment & Plan  Lower extremity venous duplex on 1/20: Evidence of focal acute DVT in 1 of 2 peroneal veins on LLE    Completed Eliquis 10mg BID for 7 days and now receiving Eliquis 5mg BID  Follow-up with PCP as outpatient  Neurovascular checks Q shift  Multiple subsegmental pulmonary emboli without acute cor pulmonale (HCC)  Assessment & Plan  CTA PE study on 1/20: Few subsegmental pulmonary emboli in the posterior lower lobes, measured RV/LV ratio within normal limits  Completed Eliquis 10mg BID for 7 days and now receiving Eliquis 5mg BID, continue for 3 months  Follow-up with PCP as outpatient  Currently maintaining on RA  Encourage pulmonary toileting  Spot pulse ox checks  Chronic diastolic congestive heart failure (HCC)  Assessment & Plan  Wt Readings from Last 3 Encounters:   02/07/23 93 kg (205 lb 0 4 oz)   01/23/23 95 2 kg (209 lb 14 1 oz)     Stable without exacerbation   on 1/20  Last ECHO on 12/2/22 showed EF 55-59%, G1DD  Takes Lasix 20mg daily at home - currently on hold  Restart as able  Other hyperlipidemia  Assessment & Plan  Continue home Lipitor 20mg daily  Last lipid panel on 11/15/22: Cholesterol 147, Triglycerides 73, HDL 53, and LDL 79  Essential hypertension  Assessment & Plan  Home regimen: Lasix 20mg daily, metoprolol tartrate 50mg BID, losartan 50mg daily  Currently receiving metoprolol tartrate 50mg BID and losartan 25mg daily  Increase losartan to 50mg daily on 2/7  Apply abdominal binder/TEDs when getting OOB  Consider restarting Lasix when able  Monitor BP with routine VS   Follow-up with PCP as outpatient  * Closed left hip fracture [facility])  Assessment & Plan  S/p mechanical fall on 1/18  XR on 1/18 showed acute intertrochanteric L hip fracture  OR on 1/19 for IM fixation of L subtrochanteric hip fracture performed by Dr Felix Montoya  WBAT to LLE  Neurovascular checks Q shift  Ensure adequate pain control  Monitor incision for s/s of infection  Eliquis for DVT ppx  Follow-up with Orthopedics in 2 weeks   XRs on 2/2: stable appearing comminuted intertrochanteric fracture s/p ORIF, no evidence for hardware complication  Ortho consulted - staples removed  Follow-up in additional 4 weeks as outpatient  Primary team following  PT/OT  VTE Pharmacologic Prophylaxis:   Pharmacologic: Apixaban (Eliquis)  Mechanical VTE Prophylaxis in Place: No - DVT  Current Length of Stay: 15 day(s)    Current Patient Status: Inpatient Rehab     Discharge Plan: As per primary team     Code Status: Level 1 - Full Code    Subjective:   Pt examined while pt sitting in recliner in pt room  Complaints of L hip pain, 6/10, aching/throbbing, worse after therapy  Improving with ice and rest   Had taken Tylenol prior to therapy but is wondering if she should also take Robaxin due to RLE being used more than she is used to  Will place PRN that she can use as needed  Denies any lower extremity edema  Denies any lightheadedness or dizziness today but states that she does occasionally experience lightheadedness if she gets up too quickly  LBM was on , denies any abdominal pain and is passing gas  Objective:     Vitals:   Temp (24hrs), Av 4 °F (36 9 °C), Min:98 1 °F (36 7 °C), Max:98 7 °F (37 1 °C)    Temp:  [98 1 °F (36 7 °C)-98 7 °F (37 1 °C)] 98 1 °F (36 7 °C)  HR:  [64-78] 78  Resp:  [18] 18  BP: (128-154)/(60-74) 148/74  SpO2:  [92 %-95 %] 95 %  Body mass index is 36 32 kg/m²  Review of Systems   Constitutional: Negative for appetite change, chills, fatigue and fever  HENT: Negative for trouble swallowing  Eyes: Negative for visual disturbance  Respiratory: Negative for cough, chest tightness, shortness of breath, wheezing and stridor  Cardiovascular: Negative for chest pain, palpitations and leg swelling  Gastrointestinal: Negative for abdominal distention, abdominal pain, constipation, diarrhea, nausea and vomiting  LBM /, passing gas   Genitourinary: Negative for difficulty urinating     Musculoskeletal: Positive for arthralgias (L hip pain, aching/throbbing, 6/10 after completing therapy session, improves with ice and rest)  Negative for back pain and gait problem  Neurological: Negative for dizziness, weakness, light-headedness, numbness and headaches  Psychiatric/Behavioral: Negative for dysphoric mood and sleep disturbance  The patient is not nervous/anxious  All other systems reviewed and are negative  Input and Output Summary (last 24 hours): Intake/Output Summary (Last 24 hours) at 2/7/2023 0903  Last data filed at 2/7/2023 0740  Gross per 24 hour   Intake 240 ml   Output 900 ml   Net -660 ml       Physical Exam:     Physical Exam  Vitals and nursing note reviewed  Constitutional:       General: She is not in acute distress  Appearance: Normal appearance  She is obese  She is not ill-appearing  HENT:      Head: Normocephalic and atraumatic  Cardiovascular:      Rate and Rhythm: Normal rate and regular rhythm  Pulses: Normal pulses  Heart sounds: Murmur heard  Systolic murmur is present with a grade of 2/6  No friction rub  Pulmonary:      Effort: Pulmonary effort is normal  No respiratory distress  Breath sounds: Normal breath sounds  No wheezing or rhonchi  Abdominal:      General: Abdomen is flat  Bowel sounds are normal  There is no distension  Palpations: Abdomen is soft  There is no mass  Tenderness: There is no abdominal tenderness  There is no guarding or rebound  Hernia: No hernia is present  Musculoskeletal:      Cervical back: Normal range of motion and neck supple  No tenderness  Right lower leg: No edema  Left lower leg: No edema  Skin:     General: Skin is warm and dry  Findings: Bruising (Generalized ecchymosis to L thigh) present  Comments: L hip incisions, well-approximated, healing  No erythema, drainage, or edema present  Neurological:      Mental Status: She is alert and oriented to person, place, and time     Psychiatric:         Mood and Affect: Mood normal          Behavior: Behavior normal          Additional Data:     Labs:    Results from last 7 days   Lab Units 02/06/23  0459   WBC Thousand/uL 4 62   HEMOGLOBIN g/dL 9 1*   HEMATOCRIT % 29 9*   PLATELETS Thousands/uL 305   NEUTROS PCT % 70   LYMPHS PCT % 16   MONOS PCT % 11   EOS PCT % 2     Results from last 7 days   Lab Units 02/06/23  0459   SODIUM mmol/L 140   POTASSIUM mmol/L 3 9   CHLORIDE mmol/L 103   CO2 mmol/L 31   BUN mg/dL 16   CREATININE mg/dL 0 54*   ANION GAP mmol/L 6   CALCIUM mg/dL 8 8   GLUCOSE RANDOM mg/dL 101*                       Labs reviewed    Imaging:    Imaging reviewed    Recent Cultures (last 7 days):           Last 24 Hours Medication List:   Current Facility-Administered Medications   Medication Dose Route Frequency Provider Last Rate   • acetaminophen  650 mg Oral Q6H PRN ELDA Yang     • apixaban  5 mg Oral BID ELDA Yang     • atorvastatin  20 mg Oral Daily With Mick Lopez MD     • bisacodyl  10 mg Rectal Daily PRN Hellen Schmidt MD     • calcium carbonate  1,000 mg Oral Daily PRN Hellen Schmidt MD     • calcium carbonate-vitamin D  2 tablet Oral Daily With Breakfast ELDA Yang     • cholecalciferol  3,000 Units Oral Daily ELDA Yang     • docusate sodium  100 mg Oral BID Hellen Schmidt MD     • escitalopram  5 mg Oral Daily JacquelineELDA Betancourt     • hydrOXYzine HCL  10 mg Oral Q6H PRN Hellen Schmidt MD     • loratadine  10 mg Oral Daily Hellen Schmidt MD     • [START ON 2/8/2023] losartan  50 mg Oral Daily JacquelineELDA Betancourt     • menthol-methyl salicylate   Apply externally 4x Daily PRN Hellen Schmidt MD     • metoprolol tartrate  50 mg Oral BID Hellen Schmidt MD     • oxyCODONE  5 mg Oral Q6H PRN Hellen Schmidt MD     • oxyCODONE  2 5 mg Oral Q6H PRN Hellen Schmidt MD     • pantoprazole  40 mg Oral Daily Hellen Schmidt MD     • polyethylene glycol  17 g Oral Daily PRN Hellen Schmidt MD          M*Modal software was used to dictate this note    It may contain errors with dictating incorrect words or incorrect spelling  Please contact the provider directly with any questions  no

## 2023-02-09 NOTE — HISTORY OF PRESENT ILLNESS
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 \par \par In past, pt was noncompliant with statins, but became compliant in 2021. PT RESISTANT TO HIGH DOSE STATINS. \par \par pt complains of pain on left side usually with lying on it around PPM area, pt has had it before and it went away, no signs of infection/fluid on exam. \par a1c 6.4 to 6.3 now  GFR 70  LDL 93 ant  \par \par 2/10/23: \par Mod restricted aortic valve mobility, mod aortic valve sclerosis, mild aortic regurgitation, mild tricuspid regurgitation, mod mitral annular calcification, degenerative mitral valve, mild LAE, EF: 55%, Grade I DD.

## 2023-02-09 NOTE — DISCUSSION/SUMMARY
[FreeTextEntry1] : pt A1c is improved, diet control for predm \par pt on statin and LDL 97 does not want higher dose \par get 2 decho \par cp seems atypical \par if not improved, f/u with dr. wheeler \par does not seem ischemic at this time, not like ischemic symptoms \par vss

## 2023-02-09 NOTE — CARDIOLOGY SUMMARY
[de-identified] : 10/17/22: Mod restricted aortic valve mobility, mod aortic valve sclerosis, mild aortic regurgitation, mild tricuspid regurgitation, mod mitral annular calcification, degenerative mitral valve, mild LAE, EF: 55%, Grade I DD.

## 2023-02-09 NOTE — PHYSICAL EXAM
[Normal S1, S2] : normal S1, S2 [Murmur] : murmur [Clear Lung Fields] : clear lung fields [Soft] : abdomen soft [de-identified] : richard

## 2023-02-10 ENCOUNTER — APPOINTMENT (OUTPATIENT)
Dept: CARDIOLOGY | Facility: CLINIC | Age: 88
End: 2023-02-10
Payer: MEDICARE

## 2023-02-10 VITALS — DIASTOLIC BLOOD PRESSURE: 70 MMHG | HEART RATE: 70 BPM | SYSTOLIC BLOOD PRESSURE: 120 MMHG

## 2023-02-10 VITALS — HEIGHT: 62 IN | BODY MASS INDEX: 23.93 KG/M2 | WEIGHT: 130.06 LBS

## 2023-02-10 PROCEDURE — 99214 OFFICE O/P EST MOD 30 MIN: CPT

## 2023-04-27 ENCOUNTER — RX RENEWAL (OUTPATIENT)
Age: 88
End: 2023-04-27

## 2023-04-28 ENCOUNTER — APPOINTMENT (OUTPATIENT)
Dept: ELECTROPHYSIOLOGY | Facility: CLINIC | Age: 88
End: 2023-04-28
Payer: MEDICARE

## 2023-04-28 VITALS
BODY MASS INDEX: 23.61 KG/M2 | WEIGHT: 128.31 LBS | HEIGHT: 62 IN | DIASTOLIC BLOOD PRESSURE: 69 MMHG | TEMPERATURE: 98 F | SYSTOLIC BLOOD PRESSURE: 167 MMHG

## 2023-04-28 VITALS — HEART RATE: 55 BPM

## 2023-04-28 DIAGNOSIS — I45.5 OTHER SPECIFIED HEART BLOCK: ICD-10-CM

## 2023-04-28 PROCEDURE — 93000 ELECTROCARDIOGRAM COMPLETE: CPT | Mod: 59

## 2023-04-28 PROCEDURE — 93280 PM DEVICE PROGR EVAL DUAL: CPT

## 2023-04-28 RX ORDER — ROSUVASTATIN CALCIUM 20 MG/1
20 TABLET, FILM COATED ORAL DAILY
Refills: 0 | Status: COMPLETED | COMMUNITY
End: 2023-04-28

## 2023-04-28 RX ORDER — EMPAGLIFLOZIN 10 MG/1
10 TABLET, FILM COATED ORAL DAILY
Qty: 30 | Refills: 5 | Status: COMPLETED | COMMUNITY
Start: 2023-02-10 | End: 2023-04-28

## 2023-04-28 NOTE — HISTORY OF PRESENT ILLNESS
[de-identified] : \par Cardiologist: Dr. Marrero\par \par 89 yo F with history of sinus pauses s/p [employer] DC PPM (implanted in [place] 9/2009, s/p gen change on 4/15/2019 by Dr. Harley), CAD with PCI ([facility], 2014), COPD, asthma, HTN, HL, hypothyroidism. \par \par \par  The patient  complained that she had  chest pain for 3 days , she has dyspnea, no palpitations, dizziness, lightheadedness, presyncope or syncope. \par \par Presents with her son for routine device interrogation.\par

## 2023-04-28 NOTE — ASSESSMENT
[FreeTextEntry1] : 89 years old female walks with a rollator, her strides  stable and fast. \par # Sinus node dysfunction s/p PPM implant in 2019\par Routine dual chamber pacemaker interrogation\par Stable parameter. Not dependent\par No events\par \par continue metoprolol succ 50mg twice daily\par \par Son said that chest pain occurs at night, he remembers giving her banana before going to bed.Pain was gone in the morning.\par I advised son to avoid giving patient something to eat  before going to bed .\par I advised him to get an over the counter PPI like omeprazole for hyper acidity.\par \par Patient refuse remote monitoring. Given one before but never plugged it as per her son.\par RTO in 6 months\par

## 2023-04-28 NOTE — REVIEW OF SYSTEMS
[Dyspnea on exertion] : dyspnea during exertion [Chest Discomfort] : chest discomfort [Negative] : Respiratory [Lower Ext Edema] : no extremity edema [Syncope] : no syncope [Wheezing] : no wheezing

## 2023-04-28 NOTE — PROCEDURE
[No] : not [Sinus Bradycardia] : sinus bradycardia [See Device Printout] : See device printout [Pacemaker] : pacemaker [DDD] : DDD [Magnet Rate: ___ Ppm] : magnet rate was [unfilled] Ppm [___V @] : [unfilled] V [___ ms] : [unfilled] ms [Programmed for Longevity] : output reprogrammed for improved battery longevity [Voltage: ___ volts] : Voltage was [unfilled] volts [Longevity: ___ months] : The estimated remaining battery life is [unfilled] months [Sensing Amplitude ___mv] : sensing amplitude was [unfilled] mv [Lead Imp:  ___ohms] : lead impedance was [unfilled] ohms [de-identified] : [employer]  Assurity MTI [de-identified] : PM 7160 [de-identified] : [id number] [de-identified] : 4/16/19 [de-identified] : 55 [de-identified] : AP 16%\par  <1%\par Patient does not want remote transmitter\par \par Normal device function\par 1 episode of svt for 2 secs on 1/8/2023\par No events

## 2023-04-28 NOTE — CARDIOLOGY SUMMARY
[de-identified] : \par Echo ( 10/17/2022)  EF 55%\par ECHO (11/4/2021) EF 50-55% [de-identified] : ECG ( 4/28/2023) 55 bpm A paced  IA 262ms, QRS 84ms, QTC 408ms\par ECG ( 11/23/2022) 72 bpm IA 192ms, QRS 86ms, QTC 418ms\par ECG ( 11/24/2021)  58bpm sinus abby IA 214ms QRS 82ms [de-identified] : 4/15/2019 [employer] dual PPM

## 2023-05-01 ENCOUNTER — OUTPATIENT (OUTPATIENT)
Dept: OUTPATIENT SERVICES | Facility: HOSPITAL | Age: 88
LOS: 1 days | End: 2023-05-01
Payer: MEDICARE

## 2023-05-01 DIAGNOSIS — Z90.710 ACQUIRED ABSENCE OF BOTH CERVIX AND UTERUS: Chronic | ICD-10-CM

## 2023-05-01 DIAGNOSIS — R06.02 SHORTNESS OF BREATH: ICD-10-CM

## 2023-05-01 DIAGNOSIS — R91.8 OTHER NONSPECIFIC ABNORMAL FINDING OF LUNG FIELD: ICD-10-CM

## 2023-05-01 DIAGNOSIS — Z95.0 PRESENCE OF CARDIAC PACEMAKER: Chronic | ICD-10-CM

## 2023-05-01 DIAGNOSIS — H44.513 ABSOLUTE GLAUCOMA, BILATERAL: Chronic | ICD-10-CM

## 2023-05-01 DIAGNOSIS — Z90.49 ACQUIRED ABSENCE OF OTHER SPECIFIED PARTS OF DIGESTIVE TRACT: Chronic | ICD-10-CM

## 2023-05-01 DIAGNOSIS — Z86.79 PERSONAL HISTORY OF OTHER DISEASES OF THE CIRCULATORY SYSTEM: Chronic | ICD-10-CM

## 2023-05-01 DIAGNOSIS — H26.9 UNSPECIFIED CATARACT: Chronic | ICD-10-CM

## 2023-05-01 PROCEDURE — 71046 X-RAY EXAM CHEST 2 VIEWS: CPT | Mod: 26

## 2023-05-01 PROCEDURE — 71046 X-RAY EXAM CHEST 2 VIEWS: CPT

## 2023-05-02 DIAGNOSIS — R06.02 SHORTNESS OF BREATH: ICD-10-CM

## 2023-05-02 DIAGNOSIS — R91.8 OTHER NONSPECIFIC ABNORMAL FINDING OF LUNG FIELD: ICD-10-CM

## 2023-06-05 NOTE — DISCUSSION/SUMMARY
[FreeTextEntry1] : pt A1c is improved, diet control for predm \par pt on statin and LDL 97 does not want higher dose \par get 2 decho \par cp seems atypical \par if not improved, f/u with dr. wheeler \par does not seem ischemic at this time, not like ischemic symptoms \par vss \par \par 2/10/23: pt increasing a1c, will start jardiance\par if expensive pt to decide \par get bloodwork f/u in 4 months

## 2023-06-05 NOTE — CARDIOLOGY SUMMARY
[de-identified] : 10/17/22: Mod restricted aortic valve mobility, mod aortic valve sclerosis, mild aortic regurgitation, mild tricuspid regurgitation, mod mitral annular calcification, degenerative mitral valve, mild LAE, EF: 55%, Grade I DD.

## 2023-06-05 NOTE — HISTORY OF PRESENT ILLNESS
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 \par STATIN MYALGIA ON HIGHER DOSES OF STATIN. \par \par In past, pt was noncompliant with statins, but became compliant in . PT RESISTANT TO HIGH DOSE STATINS. \par \par pt complains of pain on left side usually with lying on it around PPM area, pt has had it before and it went away, no signs of infection/fluid on exam. \par a1c 6.4 to 6.3 now  GFR 70  LDL 93 ant  \par \par 2/10/23: \par Mod restricted aortic valve mobility, mod aortic valve sclerosis, mild aortic regurgitation, mild tricuspid regurgitation, mod mitral annular calcification, degenerative mitral valve, mild LAE, EF: 55%, Grade I DD. \par \par pt was feeling bad and had b12 injection last week improved, pt denies cp. pt here to f/u bloodwork. pt sob after taking a shower and pt has sob after defecating, using a walker. \par a1c: 6.6 worse from the last visit,  LDL 97 \par \par 23: \par d/w son and says having cp intermittent waking her up for night, pt feels like its her lung. pt says not worse if takes a deep breath and able to do everything. \par send sl ntg, and start ranolazine. \par pt told if severe go to hospital \par \par 23:\par HDL: 50, T, LDL: 93, A1C: 5.9

## 2023-06-05 NOTE — PHYSICAL EXAM
[Normal S1, S2] : normal S1, S2 [Murmur] : murmur [Clear Lung Fields] : clear lung fields [Soft] : abdomen soft [de-identified] : richard

## 2023-06-09 ENCOUNTER — APPOINTMENT (OUTPATIENT)
Dept: CARDIOLOGY | Facility: CLINIC | Age: 88
End: 2023-06-09
Payer: MEDICARE

## 2023-06-09 VITALS — BODY MASS INDEX: 49.69 KG/M2 | WEIGHT: 270 LBS | HEIGHT: 62 IN

## 2023-06-09 VITALS — SYSTOLIC BLOOD PRESSURE: 120 MMHG | HEART RATE: 60 BPM | DIASTOLIC BLOOD PRESSURE: 70 MMHG

## 2023-06-09 DIAGNOSIS — Z95.0 PRESENCE OF CARDIAC PACEMAKER: ICD-10-CM

## 2023-06-09 DIAGNOSIS — E11.9 TYPE 2 DIABETES MELLITUS W/OUT COMPLICATIONS: ICD-10-CM

## 2023-06-09 PROCEDURE — 99214 OFFICE O/P EST MOD 30 MIN: CPT

## 2023-06-20 ENCOUNTER — OUTPATIENT (OUTPATIENT)
Dept: OUTPATIENT SERVICES | Facility: HOSPITAL | Age: 88
LOS: 1 days | End: 2023-06-20
Payer: MEDICARE

## 2023-06-20 ENCOUNTER — RESULT REVIEW (OUTPATIENT)
Age: 88
End: 2023-06-20

## 2023-06-20 DIAGNOSIS — H44.513 ABSOLUTE GLAUCOMA, BILATERAL: Chronic | ICD-10-CM

## 2023-06-20 DIAGNOSIS — H26.9 UNSPECIFIED CATARACT: Chronic | ICD-10-CM

## 2023-06-20 DIAGNOSIS — Z90.49 ACQUIRED ABSENCE OF OTHER SPECIFIED PARTS OF DIGESTIVE TRACT: Chronic | ICD-10-CM

## 2023-06-20 DIAGNOSIS — Z95.0 PRESENCE OF CARDIAC PACEMAKER: Chronic | ICD-10-CM

## 2023-06-20 DIAGNOSIS — Z86.79 PERSONAL HISTORY OF OTHER DISEASES OF THE CIRCULATORY SYSTEM: Chronic | ICD-10-CM

## 2023-06-20 DIAGNOSIS — Z90.710 ACQUIRED ABSENCE OF BOTH CERVIX AND UTERUS: Chronic | ICD-10-CM

## 2023-06-20 DIAGNOSIS — I25.2 OLD MYOCARDIAL INFARCTION: ICD-10-CM

## 2023-06-20 PROCEDURE — 75574 CT ANGIO HRT W/3D IMAGE: CPT

## 2023-06-20 PROCEDURE — 75574 CT ANGIO HRT W/3D IMAGE: CPT | Mod: 26,MH

## 2023-06-21 ENCOUNTER — RESULT REVIEW (OUTPATIENT)
Age: 88
End: 2023-06-21

## 2023-06-21 DIAGNOSIS — I25.2 OLD MYOCARDIAL INFARCTION: ICD-10-CM

## 2023-06-22 ENCOUNTER — OUTPATIENT (OUTPATIENT)
Dept: OUTPATIENT SERVICES | Facility: HOSPITAL | Age: 88
LOS: 1 days | End: 2023-06-22
Payer: MEDICARE

## 2023-06-22 DIAGNOSIS — I25.2 OLD MYOCARDIAL INFARCTION: ICD-10-CM

## 2023-06-22 DIAGNOSIS — H26.9 UNSPECIFIED CATARACT: Chronic | ICD-10-CM

## 2023-06-22 DIAGNOSIS — H44.513 ABSOLUTE GLAUCOMA, BILATERAL: Chronic | ICD-10-CM

## 2023-06-22 DIAGNOSIS — Z86.79 PERSONAL HISTORY OF OTHER DISEASES OF THE CIRCULATORY SYSTEM: Chronic | ICD-10-CM

## 2023-06-22 DIAGNOSIS — Z90.49 ACQUIRED ABSENCE OF OTHER SPECIFIED PARTS OF DIGESTIVE TRACT: Chronic | ICD-10-CM

## 2023-06-22 DIAGNOSIS — Z90.710 ACQUIRED ABSENCE OF BOTH CERVIX AND UTERUS: Chronic | ICD-10-CM

## 2023-06-22 DIAGNOSIS — Z95.0 PRESENCE OF CARDIAC PACEMAKER: Chronic | ICD-10-CM

## 2023-06-22 PROCEDURE — 0503T: CPT

## 2023-06-22 PROCEDURE — 0502T: CPT

## 2023-06-22 PROCEDURE — 0504T: CPT

## 2023-06-23 DIAGNOSIS — I25.2 OLD MYOCARDIAL INFARCTION: ICD-10-CM

## 2023-07-06 ENCOUNTER — OUTPATIENT (OUTPATIENT)
Dept: OUTPATIENT SERVICES | Facility: HOSPITAL | Age: 88
LOS: 1 days | End: 2023-07-06
Payer: MEDICARE

## 2023-07-06 VITALS
DIASTOLIC BLOOD PRESSURE: 76 MMHG | RESPIRATION RATE: 18 BRPM | SYSTOLIC BLOOD PRESSURE: 146 MMHG | HEIGHT: 58 IN | WEIGHT: 126.1 LBS | HEART RATE: 76 BPM | TEMPERATURE: 97 F | OXYGEN SATURATION: 97 %

## 2023-07-06 DIAGNOSIS — H26.9 UNSPECIFIED CATARACT: Chronic | ICD-10-CM

## 2023-07-06 DIAGNOSIS — Z90.49 ACQUIRED ABSENCE OF OTHER SPECIFIED PARTS OF DIGESTIVE TRACT: Chronic | ICD-10-CM

## 2023-07-06 DIAGNOSIS — H44.513 ABSOLUTE GLAUCOMA, BILATERAL: Chronic | ICD-10-CM

## 2023-07-06 DIAGNOSIS — Z01.818 ENCOUNTER FOR OTHER PREPROCEDURAL EXAMINATION: ICD-10-CM

## 2023-07-06 DIAGNOSIS — Z95.0 PRESENCE OF CARDIAC PACEMAKER: Chronic | ICD-10-CM

## 2023-07-06 DIAGNOSIS — R07.9 CHEST PAIN, UNSPECIFIED: ICD-10-CM

## 2023-07-06 DIAGNOSIS — Z86.79 PERSONAL HISTORY OF OTHER DISEASES OF THE CIRCULATORY SYSTEM: Chronic | ICD-10-CM

## 2023-07-06 DIAGNOSIS — Z90.710 ACQUIRED ABSENCE OF BOTH CERVIX AND UTERUS: Chronic | ICD-10-CM

## 2023-07-06 LAB
ALBUMIN SERPL ELPH-MCNC: 4.2 G/DL — SIGNIFICANT CHANGE UP (ref 3.5–5.2)
ALP SERPL-CCNC: 137 U/L — HIGH (ref 30–115)
ALT FLD-CCNC: 9 U/L — SIGNIFICANT CHANGE UP (ref 0–41)
ANION GAP SERPL CALC-SCNC: 15 MMOL/L — HIGH (ref 7–14)
APTT BLD: 36.5 SEC — SIGNIFICANT CHANGE UP (ref 27–39.2)
AST SERPL-CCNC: 17 U/L — SIGNIFICANT CHANGE UP (ref 0–41)
BASOPHILS # BLD AUTO: 0.04 K/UL — SIGNIFICANT CHANGE UP (ref 0–0.2)
BASOPHILS NFR BLD AUTO: 0.4 % — SIGNIFICANT CHANGE UP (ref 0–1)
BILIRUB SERPL-MCNC: 0.2 MG/DL — SIGNIFICANT CHANGE UP (ref 0.2–1.2)
BUN SERPL-MCNC: 14 MG/DL — SIGNIFICANT CHANGE UP (ref 10–20)
CALCIUM SERPL-MCNC: 10.2 MG/DL — SIGNIFICANT CHANGE UP (ref 8.4–10.5)
CHLORIDE SERPL-SCNC: 100 MMOL/L — SIGNIFICANT CHANGE UP (ref 98–110)
CO2 SERPL-SCNC: 25 MMOL/L — SIGNIFICANT CHANGE UP (ref 17–32)
CREAT SERPL-MCNC: 0.8 MG/DL — SIGNIFICANT CHANGE UP (ref 0.7–1.5)
EGFR: 70 ML/MIN/1.73M2 — SIGNIFICANT CHANGE UP
EOSINOPHIL # BLD AUTO: 1.53 K/UL — HIGH (ref 0–0.7)
EOSINOPHIL NFR BLD AUTO: 15 % — HIGH (ref 0–8)
GLUCOSE SERPL-MCNC: 71 MG/DL — SIGNIFICANT CHANGE UP (ref 70–99)
HCT VFR BLD CALC: 40.3 % — SIGNIFICANT CHANGE UP (ref 37–47)
HGB BLD-MCNC: 12.5 G/DL — SIGNIFICANT CHANGE UP (ref 12–16)
IMM GRANULOCYTES NFR BLD AUTO: 0.4 % — HIGH (ref 0.1–0.3)
INR BLD: 0.98 RATIO — SIGNIFICANT CHANGE UP (ref 0.65–1.3)
LYMPHOCYTES # BLD AUTO: 2.05 K/UL — SIGNIFICANT CHANGE UP (ref 1.2–3.4)
LYMPHOCYTES # BLD AUTO: 20.2 % — LOW (ref 20.5–51.1)
MCHC RBC-ENTMCNC: 24 PG — LOW (ref 27–31)
MCHC RBC-ENTMCNC: 31 G/DL — LOW (ref 32–37)
MCV RBC AUTO: 77.4 FL — LOW (ref 81–99)
MONOCYTES # BLD AUTO: 0.93 K/UL — HIGH (ref 0.1–0.6)
MONOCYTES NFR BLD AUTO: 9.1 % — SIGNIFICANT CHANGE UP (ref 1.7–9.3)
NEUTROPHILS # BLD AUTO: 5.58 K/UL — SIGNIFICANT CHANGE UP (ref 1.4–6.5)
NEUTROPHILS NFR BLD AUTO: 54.9 % — SIGNIFICANT CHANGE UP (ref 42.2–75.2)
NRBC # BLD: 0 /100 WBCS — SIGNIFICANT CHANGE UP (ref 0–0)
PLATELET # BLD AUTO: 251 K/UL — SIGNIFICANT CHANGE UP (ref 130–400)
PMV BLD: 11.9 FL — HIGH (ref 7.4–10.4)
POTASSIUM SERPL-MCNC: 4.8 MMOL/L — SIGNIFICANT CHANGE UP (ref 3.5–5)
POTASSIUM SERPL-SCNC: 4.8 MMOL/L — SIGNIFICANT CHANGE UP (ref 3.5–5)
PROT SERPL-MCNC: 7 G/DL — SIGNIFICANT CHANGE UP (ref 6–8)
PROTHROM AB SERPL-ACNC: 11.2 SEC — SIGNIFICANT CHANGE UP (ref 9.95–12.87)
RBC # BLD: 5.21 M/UL — SIGNIFICANT CHANGE UP (ref 4.2–5.4)
RBC # FLD: 14.8 % — HIGH (ref 11.5–14.5)
SODIUM SERPL-SCNC: 140 MMOL/L — SIGNIFICANT CHANGE UP (ref 135–146)
WBC # BLD: 10.17 K/UL — SIGNIFICANT CHANGE UP (ref 4.8–10.8)
WBC # FLD AUTO: 10.17 K/UL — SIGNIFICANT CHANGE UP (ref 4.8–10.8)

## 2023-07-06 PROCEDURE — 85730 THROMBOPLASTIN TIME PARTIAL: CPT

## 2023-07-06 PROCEDURE — 85025 COMPLETE CBC W/AUTO DIFF WBC: CPT

## 2023-07-06 PROCEDURE — 93005 ELECTROCARDIOGRAM TRACING: CPT

## 2023-07-06 PROCEDURE — 99214 OFFICE O/P EST MOD 30 MIN: CPT | Mod: 25

## 2023-07-06 PROCEDURE — 93010 ELECTROCARDIOGRAM REPORT: CPT

## 2023-07-06 PROCEDURE — 36415 COLL VENOUS BLD VENIPUNCTURE: CPT

## 2023-07-06 PROCEDURE — 85610 PROTHROMBIN TIME: CPT

## 2023-07-06 PROCEDURE — 80053 COMPREHEN METABOLIC PANEL: CPT

## 2023-07-06 NOTE — H&P PST ADULT - REASON FOR ADMISSION
89 yr old female to past for left heart catheterization in cath lab dr reyes under sedation on 7/13/23

## 2023-07-06 NOTE — H&P PST ADULT - HISTORY OF PRESENT ILLNESS
89 yr old female to past for left heart catheterization in cath lab dr reyes under sedation on 7/13/23   pt with h/o cad stents rca x 2  s/p nstemi 2014  pt had cta noted "blockages " now for this cath had some sob no cp palp unable to do stairs uses elevator but can walk 1 block with rolling walker [facility] nstemi 2014 stents x 2 rca   ppm [facility] 10 yrs last interrogated 3 mos per pt and son   Pt reports no cardiopulmonary issues denies sob/erickson/cp/palpitations. Pt states no recent infections no fever no cough no uti uri. Stated exercise tolerance is  0   flights  1 block  no changes. Prem screen revd.  Pt denies any s/s covid 19 and reports no contact with known positive people. Pt has appointment for repeat covid testing pre op and instructed to continue to self monitor and report any concerns to MD. Pt will continue to practice self isolation and  exposure control measures pre op  non vac   Anesthesia Alert  NO--Difficult Airway  NO--History of neck surgery or radiation  NO--Limited ROM of neck  NO--History of Malignant hyperthermia  NO--No personal or family history of Pseudocholinesterase deficiency.  NO--Prior Anesthesia Complication  NO--Latex Allergy  NO--Loose teeth  NO--History of Rheumatoid Arthritis  NO--PREM  NO--Other_____  no bleeding issues  Chest pain    Encounter for other preprocedural examination    No pertinent family history in first degree relatives    HTN (hypertension)    Hypothyroid    CAD (coronary artery disease)    Asthma    High cholesterol    Vertigo    History of pacemaker    History of cardiac arrhythmia    History of cholecystectomy    H/O abdominal hysterectomy    Absolute glaucoma of both eyes    Cataract, bilateral    Z01.818    [id number]

## 2023-07-07 DIAGNOSIS — Z01.818 ENCOUNTER FOR OTHER PREPROCEDURAL EXAMINATION: ICD-10-CM

## 2023-07-07 DIAGNOSIS — R07.9 CHEST PAIN, UNSPECIFIED: ICD-10-CM

## 2023-07-13 ENCOUNTER — OUTPATIENT (OUTPATIENT)
Dept: OUTPATIENT SERVICES | Facility: HOSPITAL | Age: 88
LOS: 1 days | End: 2023-07-13
Payer: MEDICARE

## 2023-07-13 DIAGNOSIS — Z90.49 ACQUIRED ABSENCE OF OTHER SPECIFIED PARTS OF DIGESTIVE TRACT: Chronic | ICD-10-CM

## 2023-07-13 DIAGNOSIS — H44.513 ABSOLUTE GLAUCOMA, BILATERAL: Chronic | ICD-10-CM

## 2023-07-13 DIAGNOSIS — Z90.710 ACQUIRED ABSENCE OF BOTH CERVIX AND UTERUS: Chronic | ICD-10-CM

## 2023-07-13 DIAGNOSIS — H26.9 UNSPECIFIED CATARACT: Chronic | ICD-10-CM

## 2023-07-13 DIAGNOSIS — Z95.0 PRESENCE OF CARDIAC PACEMAKER: Chronic | ICD-10-CM

## 2023-07-13 DIAGNOSIS — Z86.79 PERSONAL HISTORY OF OTHER DISEASES OF THE CIRCULATORY SYSTEM: Chronic | ICD-10-CM

## 2023-07-13 PROCEDURE — 80048 BASIC METABOLIC PNL TOTAL CA: CPT

## 2023-07-13 PROCEDURE — 85027 COMPLETE CBC AUTOMATED: CPT

## 2023-07-13 PROCEDURE — 36415 COLL VENOUS BLD VENIPUNCTURE: CPT

## 2023-07-17 ENCOUNTER — OUTPATIENT (OUTPATIENT)
Dept: INPATIENT UNIT | Facility: HOSPITAL | Age: 88
LOS: 1 days | Discharge: HOME | End: 2023-07-17
Payer: MEDICARE

## 2023-07-17 DIAGNOSIS — I65.23 OCCLUSION AND STENOSIS OF BILATERAL CAROTID ARTERIES: ICD-10-CM

## 2023-07-17 DIAGNOSIS — Z90.49 ACQUIRED ABSENCE OF OTHER SPECIFIED PARTS OF DIGESTIVE TRACT: Chronic | ICD-10-CM

## 2023-07-17 DIAGNOSIS — Z95.0 PRESENCE OF CARDIAC PACEMAKER: Chronic | ICD-10-CM

## 2023-07-17 DIAGNOSIS — R07.9 CHEST PAIN, UNSPECIFIED: ICD-10-CM

## 2023-07-17 DIAGNOSIS — Z86.79 PERSONAL HISTORY OF OTHER DISEASES OF THE CIRCULATORY SYSTEM: Chronic | ICD-10-CM

## 2023-07-17 DIAGNOSIS — H26.9 UNSPECIFIED CATARACT: Chronic | ICD-10-CM

## 2023-07-17 DIAGNOSIS — Z90.710 ACQUIRED ABSENCE OF BOTH CERVIX AND UTERUS: Chronic | ICD-10-CM

## 2023-07-17 DIAGNOSIS — H44.513 ABSOLUTE GLAUCOMA, BILATERAL: Chronic | ICD-10-CM

## 2023-07-17 LAB
ANION GAP SERPL CALC-SCNC: 13 MMOL/L — SIGNIFICANT CHANGE UP (ref 7–14)
BUN SERPL-MCNC: 14 MG/DL — SIGNIFICANT CHANGE UP (ref 10–20)
CALCIUM SERPL-MCNC: 10 MG/DL — SIGNIFICANT CHANGE UP (ref 8.4–10.5)
CHLORIDE SERPL-SCNC: 105 MMOL/L — SIGNIFICANT CHANGE UP (ref 98–110)
CO2 SERPL-SCNC: 26 MMOL/L — SIGNIFICANT CHANGE UP (ref 17–32)
CREAT SERPL-MCNC: 0.9 MG/DL — SIGNIFICANT CHANGE UP (ref 0.7–1.5)
EGFR: 61 ML/MIN/1.73M2 — SIGNIFICANT CHANGE UP
GLUCOSE SERPL-MCNC: 128 MG/DL — HIGH (ref 70–99)
HCT VFR BLD CALC: 43.9 % — SIGNIFICANT CHANGE UP (ref 37–47)
HGB BLD-MCNC: 13.5 G/DL — SIGNIFICANT CHANGE UP (ref 12–16)
MCHC RBC-ENTMCNC: 24.2 PG — LOW (ref 27–31)
MCHC RBC-ENTMCNC: 30.8 G/DL — LOW (ref 32–37)
MCV RBC AUTO: 78.5 FL — LOW (ref 81–99)
NRBC # BLD: 0 /100 WBCS — SIGNIFICANT CHANGE UP (ref 0–0)
PLATELET # BLD AUTO: 262 K/UL — SIGNIFICANT CHANGE UP (ref 130–400)
PMV BLD: 11.9 FL — HIGH (ref 7.4–10.4)
POTASSIUM SERPL-MCNC: 4.9 MMOL/L — SIGNIFICANT CHANGE UP (ref 3.5–5)
POTASSIUM SERPL-SCNC: 4.9 MMOL/L — SIGNIFICANT CHANGE UP (ref 3.5–5)
RBC # BLD: 5.59 M/UL — HIGH (ref 4.2–5.4)
RBC # FLD: 14.6 % — HIGH (ref 11.5–14.5)
SODIUM SERPL-SCNC: 144 MMOL/L — SIGNIFICANT CHANGE UP (ref 135–146)
WBC # BLD: 8.85 K/UL — SIGNIFICANT CHANGE UP (ref 4.8–10.8)
WBC # FLD AUTO: 8.85 K/UL — SIGNIFICANT CHANGE UP (ref 4.8–10.8)

## 2023-07-17 PROCEDURE — 80048 BASIC METABOLIC PNL TOTAL CA: CPT

## 2023-07-17 PROCEDURE — C1887: CPT

## 2023-07-17 PROCEDURE — C1769: CPT

## 2023-07-17 PROCEDURE — 93454 CORONARY ARTERY ANGIO S&I: CPT | Mod: 26

## 2023-07-17 PROCEDURE — 85027 COMPLETE CBC AUTOMATED: CPT

## 2023-07-17 PROCEDURE — 36415 COLL VENOUS BLD VENIPUNCTURE: CPT

## 2023-07-17 PROCEDURE — 93454 CORONARY ARTERY ANGIO S&I: CPT

## 2023-07-17 PROCEDURE — C1894: CPT

## 2023-07-17 RX ORDER — FLUTICASONE PROPIONATE AND SALMETEROL 50; 250 UG/1; UG/1
1 POWDER ORAL; RESPIRATORY (INHALATION)
Qty: 0 | Refills: 0 | DISCHARGE

## 2023-07-17 RX ORDER — METOPROLOL TARTRATE 100 MG/1
100 TABLET, FILM COATED ORAL
Qty: 2 | Refills: 0 | Status: DISCONTINUED | COMMUNITY
Start: 2023-06-09 | End: 2023-07-17

## 2023-07-17 RX ORDER — ISOSORBIDE MONONITRATE 60 MG/1
1 TABLET, EXTENDED RELEASE ORAL
Qty: 30 | Refills: 3
Start: 2023-07-17 | End: 2023-11-13

## 2023-07-17 RX ORDER — ROSUVASTATIN CALCIUM 5 MG/1
1 TABLET ORAL
Qty: 0 | Refills: 0 | DISCHARGE

## 2023-07-17 RX ORDER — AMLODIPINE BESYLATE 2.5 MG/1
1 TABLET ORAL
Qty: 30 | Refills: 3
Start: 2023-07-17 | End: 2023-11-13

## 2023-07-17 RX ORDER — ASPIRIN/CALCIUM CARB/MAGNESIUM 324 MG
1 TABLET ORAL
Qty: 0 | Refills: 0 | DISCHARGE

## 2023-07-17 RX ORDER — METOPROLOL TARTRATE 50 MG
1 TABLET ORAL
Qty: 0 | Refills: 0 | DISCHARGE

## 2023-07-17 RX ORDER — LEVOTHYROXINE SODIUM 125 MCG
1 TABLET ORAL
Qty: 0 | Refills: 0 | DISCHARGE

## 2023-07-17 NOTE — CARDIOLOGY SUMMARY
[de-identified] : 10/17/22: Mod restricted aortic valve mobility, mod aortic valve sclerosis, mild aortic regurgitation, mild tricuspid regurgitation, mod mitral annular calcification, degenerative mitral valve, mild LAE, EF: 55%, Grade I DD.  [de-identified] : 6/20/23:\par CTA submited for FFR. scattered lung nodule opacities measuring up to 8mm in medical left upper lobe. severe stenosis of the dital RCA. LAD and LCX are not well evaluated due to extensive disease.

## 2023-07-17 NOTE — PHYSICAL EXAM
[Normal S1, S2] : normal S1, S2 [Murmur] : murmur [Clear Lung Fields] : clear lung fields [Soft] : abdomen soft [de-identified] : richard

## 2023-07-17 NOTE — HISTORY OF PRESENT ILLNESS
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 \par STATIN MYALGIA ON HIGHER DOSES OF STATIN. \par \par In past, pt was noncompliant with statins, but became compliant in . PT RESISTANT TO HIGH DOSE STATINS. \par a1c 6.4 to 6.3 now  GFR 70  LDL 93 ant  \par \par 2/10/23: \par ECHO: Mod restricted aortic valve mobility, mod aortic valve sclerosis, mild aortic regurgitation, mild tricuspid regurgitation, mod mitral annular calcification, degenerative mitral valve, mild LAE, EF: 55%, Grade I DD.  \par a1c: 6.6 worse from the last visit,  LDL 97 \par \par 23: \par d/w son and says having cp intermittent waking her up for night, pt feels like its her lung. pt says not worse if takes a deep breath and able to do everything. \par send sl ntg, and start ranolazine. \par pt told if severe go to hospital \par \par 23:\par HDL: 50, T, LDL: 93, A1C: 5.9, A1C improved, jardiance started last visit. As per son pt is not taking jardiance. \par pt not taking Jardiance and advised to take it, pt having bad abdominal pain worsening. pt saw Pmd and says due to pineapple, pt had n/v and sob with taking a shower. \par \par 23: LAD: extensive calcified and noncaldified plaque, lcx nondiagnostic, RCA tandem long segment RCA \par CT FFR: LAD 0.64 \par pulmonary nodule send to dr. ayers at next visit. \par \par 23: LAD calcified difficult to fix, ISR RCA will treat with meds unless lots of angina d/w patient and RCA easy to fix. d/w dr. reyes and will treat medically as does not seem like patient is on the medication. pt was on bp 180 at this time and f/u and decide if on meds.

## 2023-07-17 NOTE — CHART NOTE - NSCHARTNOTEFT_GEN_A_CORE
PREOPERATIVE DAY OF PROCEDURE EVALUATION:  I have personally seen and examined the patient.  I agree with the history and physical which I have reviewed and noted any changes below.  (Signed electronically by __________)  07-17-23 @ 07:57    89 yr old female with h/o cad stents rca x 2  s/p nstemi 2014, PPM 10 years ago([facility])--last interrogation was 3 months ago per son.  Patient had ccta noted "blockages ".   Patient has been having symptoms of SOB and unable to do the stairs but able to walk 1 block with rolling walker.  Patient presents for [facility] with possible intervention.      < from: CT Angio Heart and Coronaries w/ IV Cont (06.20.23 @ 10:42) >    IMPRESSION:    Essentially nondiagnostic examination. Diffuse severe coronary arterial   disease. LAD and left circumflex lumen are not well evaluated due to   extensive disease and calcium burden. Obstructive lesions are likely.    Tandem RCA stents with areas of focal hypodensity could reflect   blooming artifact versus partial obstruction. Distal flow is maintained.    There is severe stenosis of the distal RCA secondary to a noncalcified   plaque.    CAD-RADS N/S.    Scattered lung nodular opacities measuring up to 8 mm in the medial left   upper lobe. Scattered areas of airway debris. Findings may be   postinfectious/inflammatory. Short-term follow-up CT in 3-6 months is   recommended.    This study was submitted for CT-FFR. Pending results will be dictated   separately.          < from: CT Fractional Flow Reserve (FFR-CT) Data Prep Transmission And Analysis Interpretation (06.22.23 @ 00:00) >    IMPRESSION:    Abnormal CT-FFR  extending to the distal segment with a value of 0.6 with   a decrease to abnormal CT-FFR over mid to distal LAD. Given nondiagnostic   but extensive disease within the LAD on concurrent CCTA, there is likely   a hemodynamically significant lesion or lesions in the LAD. Further   evaluation suggested.    RCA is not evaluated due to stents. Hemodynamic significance of the   distal RCA severe stenosis is not evaluated.        Cath Bleeding Risk: 1.9%    Prehydration:  ml bolus x 1 hr prior to cardiac cath    Right jamari test:  Positive    Patient took morning dose of ASA 81mg and Metoprolol   -Load patient with Clopidogrel 600mg for procedure.     Antianginals:  -Metoprolol  -NTG SL tabs

## 2023-07-17 NOTE — ASU PATIENT PROFILE, ADULT - FALL HARM RISK - RISK INTERVENTIONS

## 2023-07-17 NOTE — DISCUSSION/SUMMARY
[FreeTextEntry1] : 2/10/23: pt increasing a1c, will start jardiance\par if expensive pt to decide \par get blood work f/u in 4 months \par \par 6/9/23: \par pt has ? hiatal hernia, see GI \par ? anginal equivalent \par had CAD in the past. \par pt told to start jardiance. \par pt did not start ranolazine 500 mg po q12 \par get CTA \par \par 6/20/23: d/w son and pt advised to get stent with dr. reyes. continue ranolazine\par pt advised to get cath on patient.

## 2023-07-17 NOTE — CHART NOTE - NSCHARTNOTEFT_GEN_A_CORE
PRE-OP DIAGNOSIS:    CCTA showed severe distal disease    PROCEDURE:     [] Coronary Angiogram     [x] LHC       [] Intervention (see below)         PHYSICIAN:  Dr. Laird    ASSISTANT:  Dr. Sahni       PROCEDURE DESCRIPTION:     Consent:      [x] Patient       Anesthesia:       [x] Sedation     [x] Local        Access & Closure:     [x]6 Fr Radial Artery , Radial vasc band          IV Contrast:50 mL        Intervention: None      Implants: None       FINDINGS:     Coronary Dominance: Right       LM: Average size, Minor luminal irregularities.     LAD:   P-LAD: has mild disease.  Mid-LAD: has 70 percent disease , it is bifurcation lesion involving the D2 and heavy calcification is present.  Distal LAD: 60 percent lesion noted in distal LAD.  D1: Mild disease  D2 is heavily calcified and has severe disease.    CX:   P-LCX: mild disease  Mid-LCX: Moderate disease 50percent   Distal LCX: Mild disease  OM1: mild disease     RCA: Medium size vessel  P-RCA: has 80 percent in-stent stenosis  Mid-RCA: has 80 percent in-stent stenosis.  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 LAD has heavy calcification.       PLAN OF CARE:     [x] D/C Home Today       [x] Medications: We will add CCB and nitrate.    [x] IV Fluids: 100 ml/hr NS for 3 hour. PRE-OP DIAGNOSIS:    CCTA showed severe distal disease    PROCEDURE:     [] Coronary Angiogram     [x] LHC       [] Intervention (see below)         PHYSICIAN:  Dr. Laird    ASSISTANT:  Dr. Sahni       PROCEDURE DESCRIPTION:     Consent:      [x] Patient       Anesthesia:       [x] Sedation     [x] Local        Access & Closure:     [x]6 Fr Radial Artery , Radial vasc band          IV Contrast:50 mL        Intervention: None      Implants: None       FINDINGS:     Coronary Dominance: Right       LM: Average size, Minor luminal irregularities.     LAD:   P-LAD: has mild disease.  Mid-LAD: has 90 percent disease , it is bifurcation lesion involving the D2 and heavy calcification is present.  Distal LAD: 60 percent lesion noted in distal LAD.  D1: Mild disease  D2 is heavily calcified and has severe disease.    CX:   P-LCX: mild disease  Mid-LCX: Moderate disease 50percent   Distal LCX: Mild disease  OM1: mild disease     RCA: Medium size vessel  P-RCA: has 80 percent in-stent stenosis  Mid-RCA: has 80 percent in-stent stenosis.  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 LAD has heavy calcification.       PLAN OF CARE:     [x] D/C Home Today       [x] Medications: We will add CCB and nitrate, consider pci of still symptomatic on optimal med rx    [x] IV Fluids: 100 ml/hr NS for 3 hour.

## 2023-07-20 DIAGNOSIS — I25.110 ATHEROSCLEROTIC HEART DISEASE OF NATIVE CORONARY ARTERY WITH UNSTABLE ANGINA PECTORIS: ICD-10-CM

## 2023-07-20 DIAGNOSIS — Z95.5 PRESENCE OF CORONARY ANGIOPLASTY IMPLANT AND GRAFT: ICD-10-CM

## 2023-07-20 DIAGNOSIS — I25.84 CORONARY ATHEROSCLEROSIS DUE TO CALCIFIED CORONARY LESION: ICD-10-CM

## 2023-07-20 DIAGNOSIS — T82.855A STENOSIS OF CORONARY ARTERY STENT, INITIAL ENCOUNTER: ICD-10-CM
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
## 2023-08-03 DIAGNOSIS — R07.9 CHEST PAIN, UNSPECIFIED: ICD-10-CM

## 2023-08-03 DIAGNOSIS — I65.23 OCCLUSION AND STENOSIS OF BILATERAL CAROTID ARTERIES: ICD-10-CM

## 2023-08-04 DIAGNOSIS — R07.9 CHEST PAIN, UNSPECIFIED: ICD-10-CM

## 2023-08-04 DIAGNOSIS — I65.23 OCCLUSION AND STENOSIS OF BILATERAL CAROTID ARTERIES: ICD-10-CM

## 2023-09-26 NOTE — ASU DISCHARGE PLAN (ADULT/PEDIATRIC). - RN NAME (PRINT)_____________________________________________
I called patient to see if she had any success with the use of over the counter laxatives yesterday to have a bowel movement.  Patient stated she had used multiple products including an enema and still no results. It has been 4 days now since a bowel movement.  She asked about magnesium citrate and it is not available at retail pharmacies due to a recall last year.  I talked with dr. Corrales and we will send in a script for lactulose.  I advised patient that we sent that to [facility] pharmacy and she should try another enema after the lactulose starts to work to be sure and evacuate the bowel effectively.  I advised that she can back off using the over the counters once she has good bowel movements.  She verbalized understanding.    
Statement Selected

## 2023-10-24 ENCOUNTER — APPOINTMENT (OUTPATIENT)
Dept: CARDIOLOGY | Facility: CLINIC | Age: 88
End: 2023-10-24
Payer: MEDICARE

## 2023-10-24 VITALS — BODY MASS INDEX: 23.74 KG/M2 | WEIGHT: 129 LBS | HEIGHT: 62 IN

## 2023-10-24 DIAGNOSIS — Z98.61 CORONARY ANGIOPLASTY STATUS: ICD-10-CM

## 2023-10-24 DIAGNOSIS — Z00.00 ENCOUNTER FOR GENERAL ADULT MEDICAL EXAMINATION W/OUT ABNORMAL FINDINGS: ICD-10-CM

## 2023-10-24 DIAGNOSIS — I65.23 OCCLUSION AND STENOSIS OF BILATERAL CAROTID ARTERIES: ICD-10-CM

## 2023-10-24 DIAGNOSIS — R07.89 OTHER CHEST PAIN: ICD-10-CM

## 2023-10-24 DIAGNOSIS — I10 ESSENTIAL (PRIMARY) HYPERTENSION: ICD-10-CM

## 2023-10-24 DIAGNOSIS — I25.2 OLD MYOCARDIAL INFARCTION: ICD-10-CM

## 2023-10-24 PROCEDURE — 99214 OFFICE O/P EST MOD 30 MIN: CPT

## 2023-10-24 RX ORDER — RANOLAZINE 500 MG/1
500 TABLET, EXTENDED RELEASE ORAL
Qty: 60 | Refills: 3 | Status: DISCONTINUED | COMMUNITY
Start: 2023-04-28 | End: 2023-10-24

## 2023-10-25 ENCOUNTER — APPOINTMENT (OUTPATIENT)
Dept: ELECTROPHYSIOLOGY | Facility: CLINIC | Age: 88
End: 2023-10-25
Payer: MEDICARE

## 2023-10-25 VITALS
BODY MASS INDEX: 23.92 KG/M2 | RESPIRATION RATE: 18 BRPM | DIASTOLIC BLOOD PRESSURE: 76 MMHG | HEIGHT: 62 IN | HEART RATE: 65 BPM | WEIGHT: 130 LBS | SYSTOLIC BLOOD PRESSURE: 160 MMHG

## 2023-10-25 PROCEDURE — 93280 PM DEVICE PROGR EVAL DUAL: CPT

## 2024-04-03 ENCOUNTER — APPOINTMENT (OUTPATIENT)
Dept: ELECTROPHYSIOLOGY | Facility: CLINIC | Age: 89
End: 2024-04-03
Payer: MEDICARE

## 2024-04-03 VITALS
RESPIRATION RATE: 17 BRPM | BODY MASS INDEX: 23.37 KG/M2 | SYSTOLIC BLOOD PRESSURE: 175 MMHG | DIASTOLIC BLOOD PRESSURE: 72 MMHG | WEIGHT: 127 LBS | HEART RATE: 56 BPM | TEMPERATURE: 97.7 F | HEIGHT: 62 IN

## 2024-04-03 DIAGNOSIS — I49.5 SICK SINUS SYNDROME: ICD-10-CM

## 2024-04-03 DIAGNOSIS — Z45.018 ENCOUNTER FOR ADJUSTMENT AND MANAGEMENT OF OTHER PART OF CARDIAC PACEMAKER: ICD-10-CM

## 2024-04-03 PROCEDURE — 93000 ELECTROCARDIOGRAM COMPLETE: CPT | Mod: 59

## 2024-04-03 PROCEDURE — 99214 OFFICE O/P EST MOD 30 MIN: CPT

## 2024-04-03 PROCEDURE — 93280 PM DEVICE PROGR EVAL DUAL: CPT

## 2024-04-03 RX ORDER — AMPICILLIN 500 MG/1
500 CAPSULE ORAL
Refills: 0 | Status: ACTIVE | COMMUNITY

## 2024-04-03 NOTE — REVIEW OF SYSTEMS
[Dyspnea on exertion] : dyspnea during exertion [Chest Discomfort] : chest discomfort [Negative] : Respiratory [Syncope] : no syncope [Lower Ext Edema] : no extremity edema [Wheezing] : no wheezing

## 2024-04-03 NOTE — PROCEDURE
[No] : not [Sinus Bradycardia] : sinus bradycardia [See Device Printout] : See device printout [Pacemaker] : pacemaker [DDD] : DDD [Voltage: ___ volts] : Voltage was [unfilled] volts [Magnet Rate: ___ Ppm] : magnet rate was [unfilled] Ppm [Longevity: ___ months] : The estimated remaining battery life is [unfilled] months [Lead Imp:  ___ohms] : lead impedance was [unfilled] ohms [___V @] : [unfilled] V [Sensing Amplitude ___mv] : sensing amplitude was [unfilled] mv [___ ms] : [unfilled] ms [Programmed for Longevity] : output reprogrammed for improved battery longevity [Threshold Testing Performed] : Threshold testing was performed [de-identified] : [employer]  Assurity MTI [de-identified] : PM 3247 [de-identified] : [id number] [de-identified] : 4/16/19 [de-identified] : 55 [de-identified] : AP 16%\par   <1%\par  Patient does not want remote transmitter\par  \par  Normal device function\par  1 episode of svt for 2 secs on 1/8/2023\par  No events

## 2024-04-03 NOTE — CARDIOLOGY SUMMARY
[de-identified] : \par  Echo ( 10/17/2022)  EF 55%\par  ECHO (11/4/2021) EF 50-55% [de-identified] : ECG (4/3/2024) 55 bpm A paced, V sense ECG ( 4/28/2023) 55 bpm A paced  KY 262ms, QRS 84ms, QTC 408ms ECG ( 11/23/2022) 72 bpm KY 192ms, QRS 86ms, QTC 418ms ECG ( 11/24/2021)  58bpm sinus abby KY 214ms QRS 82ms [de-identified] : 4/15/2019 [employer] dual PPM

## 2024-04-03 NOTE — PHYSICAL EXAM
[General Appearance - Well Developed] : well developed [Normal Appearance] : normal appearance [Well Groomed] : well groomed [General Appearance - Well Nourished] : well nourished [No Deformities] : no deformities [General Appearance - In No Acute Distress] : no acute distress [Heart Rate And Rhythm] : heart rate and rhythm were normal [Heart Sounds] : normal S1 and S2 [Murmurs] : no murmurs present [Edema] : no peripheral edema present [] : no respiratory distress [Respiration, Rhythm And Depth] : normal respiratory rhythm and effort [Exaggerated Use Of Accessory Muscles For Inspiration] : no accessory muscle use [Auscultation Breath Sounds / Voice Sounds] : lungs were clear to auscultation bilaterally [Left Infraclavicular] : left infraclavicular area [Clean] : clean [Dry] : dry [Well-Healed] : well-healed [Nail Clubbing] : no clubbing of the fingernails [Abdomen Soft] : soft

## 2024-04-03 NOTE — HISTORY OF PRESENT ILLNESS
[de-identified] : Cardiologist: Dr. Marrero  89 yo F with history of sinus pauses s/p [employer] DC PPM (implanted in [place] 9/2009, s/p gen change on 4/15/2019 by Dr. Harley), CAD with PCI ([facility], 2014), COPD, asthma, HTN, HL, hypothyroidism.   stable chronic sob. No palpitations, no dizziness, No syncope, denies cp.  Presents with her son for routine device interrogation.

## 2024-04-03 NOTE — ASSESSMENT
[FreeTextEntry1] : 89 years old female walks with a rollator, her strides  stable and fast.  # Sinus node dysfunction s/p PPM implant in 2019 Routine dual chamber pacemaker interrogation Stable parameter. Not dependent No events  #HTN - not controlled- son said she eats whatever she wants. Hotdog etc... continue metoprolol succ 50mg twice daily  #URI - has cough, 1 night fever.  She is taking Ampicillin and has 4 more days  Patient refuse remote monitoring. Given one before but never plugged it as per her son. RTO in 6-8  months

## 2024-04-09 ENCOUNTER — APPOINTMENT (OUTPATIENT)
Dept: CARDIOLOGY | Facility: CLINIC | Age: 89
End: 2024-04-09
Payer: MEDICARE

## 2024-04-09 VITALS
RESPIRATION RATE: 18 BRPM | BODY MASS INDEX: 23.37 KG/M2 | SYSTOLIC BLOOD PRESSURE: 162 MMHG | HEIGHT: 62 IN | HEART RATE: 70 BPM | TEMPERATURE: 97.3 F | DIASTOLIC BLOOD PRESSURE: 76 MMHG | WEIGHT: 127 LBS

## 2024-04-09 DIAGNOSIS — I25.10 ATHEROSCLEROTIC HEART DISEASE OF NATIVE CORONARY ARTERY W/OUT ANGINA PECTORIS: ICD-10-CM

## 2024-04-09 DIAGNOSIS — I10 ESSENTIAL (PRIMARY) HYPERTENSION: ICD-10-CM

## 2024-04-09 DIAGNOSIS — I50.32 CHRONIC DIASTOLIC (CONGESTIVE) HEART FAILURE: ICD-10-CM

## 2024-04-09 DIAGNOSIS — E78.2 MIXED HYPERLIPIDEMIA: ICD-10-CM

## 2024-04-09 PROCEDURE — 93000 ELECTROCARDIOGRAM COMPLETE: CPT

## 2024-04-09 PROCEDURE — 99204 OFFICE O/P NEW MOD 45 MIN: CPT

## 2024-04-09 PROCEDURE — G2211 COMPLEX E/M VISIT ADD ON: CPT

## 2024-04-09 RX ORDER — NITROGLYCERIN 0.4 MG/1
0.4 TABLET SUBLINGUAL
Qty: 90 | Refills: 3 | Status: ACTIVE | COMMUNITY
Start: 2023-04-28

## 2024-04-09 RX ORDER — ROSUVASTATIN CALCIUM 10 MG/1
10 TABLET, FILM COATED ORAL DAILY
Qty: 90 | Refills: 3 | Status: ACTIVE | COMMUNITY
Start: 1900-01-01 | End: 1900-01-01

## 2024-04-09 RX ORDER — AMLODIPINE BESYLATE 5 MG/1
5 TABLET ORAL DAILY
Qty: 90 | Refills: 3 | Status: ACTIVE | COMMUNITY
Start: 2024-04-09 | End: 1900-01-01

## 2024-04-09 RX ORDER — FLUTICASONE PROPION/SALMETEROL 250-50 MCG
250-50 BLISTER, WITH INHALATION DEVICE INHALATION DAILY
Refills: 0 | Status: ACTIVE | COMMUNITY

## 2024-04-09 RX ORDER — ASPIRIN ENTERIC COATED TABLETS 81 MG 81 MG/1
81 TABLET, DELAYED RELEASE ORAL
Qty: 90 | Refills: 3 | Status: ACTIVE | COMMUNITY
Start: 2023-06-23

## 2024-04-09 RX ORDER — METOPROLOL TARTRATE 50 MG/1
50 TABLET, FILM COATED ORAL
Qty: 180 | Refills: 3 | Status: ACTIVE | COMMUNITY
Start: 2022-12-13 | End: 1900-01-01

## 2024-04-09 RX ORDER — LEVOTHYROXINE SODIUM 50 UG/1
50 TABLET ORAL DAILY
Refills: 0 | Status: ACTIVE | COMMUNITY

## 2024-04-09 NOTE — HISTORY OF PRESENT ILLNESS
[FreeTextEntry1] : 89 y/o female with HTN, DL, CAD, aortic valve disease, presents for evaluation. Patient previously followed with Dr. Marrero would like to switch to this location. No chest pain, does not c/o exertional dyspnea, but her exertional capacity is limited. Prior w/u noted, echo noted, CTA/FFR noted.

## 2024-04-09 NOTE — ASSESSMENT
[FreeTextEntry1] : 89 y/o lady with CAD, s/p PCI of RCA, diffuse LAD lesion, HTN, DL  Prior w/u reviewed. Options discussed with patient and son.  Given minimal angina and her age, as well as frailty, would continue with medical therapy. She did not tolerate ranexa - self d/c.  C/w metoprolol Add amlodipine C/w ASA 81 mg C/w statin  Rational discussed.  Medical therapy for aortic disease. GOC discussed with the son. Both him and the patient would like to avoid any invasive procedures.  F/u in 3 months.

## 2024-04-09 NOTE — REVIEW OF SYSTEMS
[Feeling Fatigued] : feeling fatigued [Blurry Vision] : blurred vision [Hearing Loss] : hearing loss [SOB] : shortness of breath [Joint Pain] : joint pain [Negative] : Heme/Lymph

## 2024-04-15 ENCOUNTER — APPOINTMENT (OUTPATIENT)
Dept: CARDIOLOGY | Facility: CLINIC | Age: 89
End: 2024-04-15

## 2024-09-16 ENCOUNTER — OUTPATIENT (OUTPATIENT)
Dept: OUTPATIENT SERVICES | Facility: HOSPITAL | Age: 89
LOS: 1 days | End: 2024-09-16
Payer: MEDICARE

## 2024-09-16 DIAGNOSIS — M54.2 CERVICALGIA: ICD-10-CM

## 2024-09-16 DIAGNOSIS — H44.513 ABSOLUTE GLAUCOMA, BILATERAL: Chronic | ICD-10-CM

## 2024-09-16 DIAGNOSIS — Z86.79 PERSONAL HISTORY OF OTHER DISEASES OF THE CIRCULATORY SYSTEM: Chronic | ICD-10-CM

## 2024-09-16 DIAGNOSIS — R91.8 OTHER NONSPECIFIC ABNORMAL FINDING OF LUNG FIELD: ICD-10-CM

## 2024-09-16 PROCEDURE — 70360 X-RAY EXAM OF NECK: CPT | Mod: 26

## 2024-09-16 PROCEDURE — 71046 X-RAY EXAM CHEST 2 VIEWS: CPT | Mod: 26

## 2024-09-16 PROCEDURE — 71046 X-RAY EXAM CHEST 2 VIEWS: CPT

## 2024-09-16 PROCEDURE — 70360 X-RAY EXAM OF NECK: CPT

## 2024-09-17 DIAGNOSIS — R91.8 OTHER NONSPECIFIC ABNORMAL FINDING OF LUNG FIELD: ICD-10-CM

## 2024-09-17 DIAGNOSIS — M54.2 CERVICALGIA: ICD-10-CM

## 2024-09-30 ENCOUNTER — APPOINTMENT (OUTPATIENT)
Dept: OBGYN | Facility: CLINIC | Age: 89
End: 2024-09-30
Payer: MEDICARE

## 2024-09-30 VITALS
WEIGHT: 127 LBS | HEART RATE: 66 BPM | DIASTOLIC BLOOD PRESSURE: 74 MMHG | SYSTOLIC BLOOD PRESSURE: 207 MMHG | HEIGHT: 62 IN | BODY MASS INDEX: 23.37 KG/M2

## 2024-09-30 DIAGNOSIS — R10.2 PELVIC AND PERINEAL PAIN: ICD-10-CM

## 2024-09-30 DIAGNOSIS — N39.0 URINARY TRACT INFECTION, SITE NOT SPECIFIED: ICD-10-CM

## 2024-09-30 PROCEDURE — 99203 OFFICE O/P NEW LOW 30 MIN: CPT

## 2024-09-30 PROCEDURE — 81003 URINALYSIS AUTO W/O SCOPE: CPT | Mod: QW

## 2024-09-30 NOTE — HISTORY OF PRESENT ILLNESS
[FreeTextEntry1] : Patient came in for emergency visit has pelvic pain .. Patient had noticed blood when wiping from possibly urinating and was treated with antibiotics for uti and developed antibiotic colitis. complains of lower abdominal pelvic pain. Patient has a history of hysterectomy

## 2024-09-30 NOTE — PLAN
[FreeTextEntry1] : office ua shows no nitrites no leukocytes no hematuria. A  pelvic and transvaginal sonogram does not show a uterus or adnexa or fluid in the cul de  sac. I spoke with her son that this abdominal pelvic pain if worsens should be investigated with a CT scan of the pelvis and abdomen to workup for diverticular disease

## 2024-09-30 NOTE — PHYSICAL EXAM
[Vulvar Atrophy] : vulvar atrophy [Labia Majora] : normal [Labia Minora] : normal [Normal] : normal [Atrophy] : atrophy [Absent] : absent [Uterine Adnexae] : absent

## 2024-10-07 LAB
APPEARANCE: CLEAR
BACTERIA UR CULT: NORMAL
BILIRUB UR QL STRIP: NORMAL
BILIRUBIN URINE: NEGATIVE
BLOOD URINE: NEGATIVE
CLARITY UR: CLEAR
COLLECTION METHOD: NORMAL
COLOR: YELLOW
GLUCOSE QUALITATIVE U: NEGATIVE MG/DL
GLUCOSE UR-MCNC: NORMAL
HCG UR QL: 0.2 EU/DL
HGB UR QL STRIP.AUTO: NORMAL
KETONES UR-MCNC: NORMAL
KETONES URINE: NEGATIVE MG/DL
LEUKOCYTE ESTERASE UR QL STRIP: NORMAL
LEUKOCYTE ESTERASE URINE: NEGATIVE
NITRITE UR QL STRIP: NORMAL
NITRITE URINE: NEGATIVE
PH UR STRIP: 5.5
PH URINE: 6
PROT UR STRIP-MCNC: NORMAL
PROTEIN URINE: NEGATIVE MG/DL
SP GR UR STRIP: 1.01
SPECIFIC GRAVITY URINE: 1.01
UROBILINOGEN URINE: 0.2 MG/DL

## 2024-10-08 ENCOUNTER — APPOINTMENT (OUTPATIENT)
Dept: CARDIOLOGY | Facility: CLINIC | Age: 89
End: 2024-10-08
Payer: MEDICARE

## 2024-10-08 VITALS
HEART RATE: 55 BPM | DIASTOLIC BLOOD PRESSURE: 86 MMHG | HEIGHT: 62 IN | WEIGHT: 127 LBS | SYSTOLIC BLOOD PRESSURE: 140 MMHG | BODY MASS INDEX: 23.37 KG/M2 | TEMPERATURE: 98 F

## 2024-10-08 DIAGNOSIS — R07.89 OTHER CHEST PAIN: ICD-10-CM

## 2024-10-08 DIAGNOSIS — I25.10 ATHEROSCLEROTIC HEART DISEASE OF NATIVE CORONARY ARTERY W/OUT ANGINA PECTORIS: ICD-10-CM

## 2024-10-08 DIAGNOSIS — E78.2 MIXED HYPERLIPIDEMIA: ICD-10-CM

## 2024-10-08 DIAGNOSIS — I50.32 CHRONIC DIASTOLIC (CONGESTIVE) HEART FAILURE: ICD-10-CM

## 2024-10-08 DIAGNOSIS — I10 ESSENTIAL (PRIMARY) HYPERTENSION: ICD-10-CM

## 2024-10-08 PROCEDURE — 93000 ELECTROCARDIOGRAM COMPLETE: CPT

## 2024-10-08 PROCEDURE — G2211 COMPLEX E/M VISIT ADD ON: CPT

## 2024-10-08 PROCEDURE — 99214 OFFICE O/P EST MOD 30 MIN: CPT

## 2024-12-17 ENCOUNTER — APPOINTMENT (OUTPATIENT)
Dept: CARDIOLOGY | Facility: CLINIC | Age: 88
End: 2024-12-17

## 2025-01-15 ENCOUNTER — RX RENEWAL (OUTPATIENT)
Age: 89
End: 2025-01-15

## 2025-01-23 ENCOUNTER — RX RENEWAL (OUTPATIENT)
Age: 89
End: 2025-01-23

## 2025-03-25 NOTE — ASU PATIENT PROFILE, ADULT - PATIENT'S HEIGHT AND WEIGHT RECORDED IN THE VITAL SIGNS FLOWSHEET
Medication: verapamil (CALAN SR) 180 MG CR tablet  passed protocol.   Last office visit date: 10/30/2024  Next appointment scheduled?: Yes   Number of refills given: 1   yes

## 2025-05-06 ENCOUNTER — APPOINTMENT (OUTPATIENT)
Dept: CARDIOLOGY | Facility: CLINIC | Age: 89
End: 2025-05-06
Payer: MEDICARE

## 2025-05-06 ENCOUNTER — NON-APPOINTMENT (OUTPATIENT)
Age: 89
End: 2025-05-06

## 2025-05-06 VITALS
BODY MASS INDEX: 21.9 KG/M2 | WEIGHT: 119 LBS | DIASTOLIC BLOOD PRESSURE: 70 MMHG | TEMPERATURE: 98 F | HEART RATE: 58 BPM | SYSTOLIC BLOOD PRESSURE: 150 MMHG | HEIGHT: 62 IN

## 2025-05-06 DIAGNOSIS — I50.32 CHRONIC DIASTOLIC (CONGESTIVE) HEART FAILURE: ICD-10-CM

## 2025-05-06 DIAGNOSIS — I10 ESSENTIAL (PRIMARY) HYPERTENSION: ICD-10-CM

## 2025-05-06 DIAGNOSIS — I25.10 ATHEROSCLEROTIC HEART DISEASE OF NATIVE CORONARY ARTERY W/OUT ANGINA PECTORIS: ICD-10-CM

## 2025-05-06 DIAGNOSIS — E78.2 MIXED HYPERLIPIDEMIA: ICD-10-CM

## 2025-05-06 DIAGNOSIS — I65.23 OCCLUSION AND STENOSIS OF BILATERAL CAROTID ARTERIES: ICD-10-CM

## 2025-05-06 DIAGNOSIS — E11.9 TYPE 2 DIABETES MELLITUS W/OUT COMPLICATIONS: ICD-10-CM

## 2025-05-06 PROCEDURE — 99214 OFFICE O/P EST MOD 30 MIN: CPT

## 2025-05-06 PROCEDURE — 93000 ELECTROCARDIOGRAM COMPLETE: CPT

## 2025-05-06 PROCEDURE — G2211 COMPLEX E/M VISIT ADD ON: CPT

## 2025-06-06 ENCOUNTER — APPOINTMENT (OUTPATIENT)
Dept: ELECTROPHYSIOLOGY | Facility: CLINIC | Age: 89
End: 2025-06-06
Payer: MEDICARE

## 2025-06-06 VITALS
BODY MASS INDEX: 20.98 KG/M2 | DIASTOLIC BLOOD PRESSURE: 82 MMHG | WEIGHT: 114 LBS | HEIGHT: 62 IN | SYSTOLIC BLOOD PRESSURE: 138 MMHG | HEART RATE: 57 BPM

## 2025-06-06 PROCEDURE — 99213 OFFICE O/P EST LOW 20 MIN: CPT

## 2025-06-06 PROCEDURE — 93280 PM DEVICE PROGR EVAL DUAL: CPT

## 2025-06-18 ENCOUNTER — APPOINTMENT (OUTPATIENT)
Dept: ELECTROPHYSIOLOGY | Facility: CLINIC | Age: 89
End: 2025-06-18

## 2025-06-23 ENCOUNTER — EMERGENCY (EMERGENCY)
Facility: HOSPITAL | Age: 89
LOS: 0 days | Discharge: HOME | End: 2025-06-23
Attending: EMERGENCY MEDICINE
Payer: MEDICARE

## 2025-06-23 VITALS
SYSTOLIC BLOOD PRESSURE: 209 MMHG | OXYGEN SATURATION: 95 % | TEMPERATURE: 98 F | DIASTOLIC BLOOD PRESSURE: 75 MMHG | RESPIRATION RATE: 17 BRPM | HEIGHT: 58 IN | WEIGHT: 113.1 LBS | HEART RATE: 55 BPM

## 2025-06-23 VITALS — DIASTOLIC BLOOD PRESSURE: 88 MMHG | SYSTOLIC BLOOD PRESSURE: 128 MMHG

## 2025-06-23 DIAGNOSIS — Z95.0 PRESENCE OF CARDIAC PACEMAKER: ICD-10-CM

## 2025-06-23 DIAGNOSIS — R30.0 DYSURIA: ICD-10-CM

## 2025-06-23 DIAGNOSIS — Z88.8 ALLERGY STATUS TO OTHER DRUGS, MEDICAMENTS AND BIOLOGICAL SUBSTANCES: ICD-10-CM

## 2025-06-23 DIAGNOSIS — I25.10 ATHEROSCLEROTIC HEART DISEASE OF NATIVE CORONARY ARTERY WITHOUT ANGINA PECTORIS: ICD-10-CM

## 2025-06-23 DIAGNOSIS — Z90.49 ACQUIRED ABSENCE OF OTHER SPECIFIED PARTS OF DIGESTIVE TRACT: Chronic | ICD-10-CM

## 2025-06-23 DIAGNOSIS — I10 ESSENTIAL (PRIMARY) HYPERTENSION: ICD-10-CM

## 2025-06-23 DIAGNOSIS — H44.513 ABSOLUTE GLAUCOMA, BILATERAL: Chronic | ICD-10-CM

## 2025-06-23 DIAGNOSIS — Z86.79 PERSONAL HISTORY OF OTHER DISEASES OF THE CIRCULATORY SYSTEM: Chronic | ICD-10-CM

## 2025-06-23 DIAGNOSIS — H26.9 UNSPECIFIED CATARACT: Chronic | ICD-10-CM

## 2025-06-23 DIAGNOSIS — N39.0 URINARY TRACT INFECTION, SITE NOT SPECIFIED: ICD-10-CM

## 2025-06-23 DIAGNOSIS — J45.909 UNSPECIFIED ASTHMA, UNCOMPLICATED: ICD-10-CM

## 2025-06-23 DIAGNOSIS — Z95.0 PRESENCE OF CARDIAC PACEMAKER: Chronic | ICD-10-CM

## 2025-06-23 DIAGNOSIS — Z90.710 ACQUIRED ABSENCE OF BOTH CERVIX AND UTERUS: Chronic | ICD-10-CM

## 2025-06-23 LAB
APPEARANCE UR: CLEAR — SIGNIFICANT CHANGE UP
BILIRUB UR-MCNC: NEGATIVE — SIGNIFICANT CHANGE UP
COLOR SPEC: YELLOW — SIGNIFICANT CHANGE UP
DIFF PNL FLD: NEGATIVE — SIGNIFICANT CHANGE UP
GLUCOSE UR QL: NEGATIVE MG/DL — SIGNIFICANT CHANGE UP
KETONES UR QL: NEGATIVE MG/DL — SIGNIFICANT CHANGE UP
LEUKOCYTE ESTERASE UR-ACNC: ABNORMAL
NITRITE UR-MCNC: NEGATIVE — SIGNIFICANT CHANGE UP
PH UR: 6 — SIGNIFICANT CHANGE UP (ref 5–8)
PROT UR-MCNC: 30 MG/DL
SP GR SPEC: 1.02 — SIGNIFICANT CHANGE UP (ref 1–1.03)
UROBILINOGEN FLD QL: 0.2 MG/DL — SIGNIFICANT CHANGE UP (ref 0.2–1)

## 2025-06-23 PROCEDURE — 99284 EMERGENCY DEPT VISIT MOD MDM: CPT

## 2025-06-23 PROCEDURE — 87077 CULTURE AEROBIC IDENTIFY: CPT

## 2025-06-23 PROCEDURE — 81001 URINALYSIS AUTO W/SCOPE: CPT

## 2025-06-23 PROCEDURE — 87086 URINE CULTURE/COLONY COUNT: CPT

## 2025-06-23 PROCEDURE — 87186 SC STD MICRODIL/AGAR DIL: CPT

## 2025-06-23 RX ORDER — HYDROCORTISONE 10 MG/G
1 CREAM TOPICAL
Qty: 2 | Refills: 0
Start: 2025-06-23 | End: 2025-07-02

## 2025-06-23 RX ORDER — NITROFURANTOIN MACROCRYSTAL 100 MG
1 CAPSULE ORAL
Qty: 10 | Refills: 0
Start: 2025-06-23 | End: 2025-06-27

## 2025-06-23 NOTE — ED PROVIDER NOTE - PHYSICAL EXAMINATION
VITAL SIGNS: I have reviewed nursing notes and confirm.  CONSTITUTIONAL: Well-developed; well-nourished; in no acute distress.  SKIN: Skin exam is warm and dry, maculo papular non vesicular rash along R inner thigh and L chest, excoriations.  HEAD: Normocephalic; atraumatic.  EYES: conjunctiva and sclera clear.  CARD: S1, S2 normal; no murmurs, gallops, or rubs. Regular rate and rhythm.  RESP: Normal respiratory effort, no tachypnea or distress. Lungs CTAB, no wheezes, rales or rhonchi.  ABD: soft, NT/ND.  : exam performed with RN, no obvious signs of vaginal bleeding, rash, discharge.   EXT: Normal ROM. No clubbing, cyanosis or edema.  NEURO: Alert, oriented. Grossly unremarkable. No focal deficits.  PSYCH: Cooperative, appropriate.

## 2025-06-23 NOTE — ED PROVIDER NOTE - PATIENT PORTAL LINK FT
You can access the FollowMyHealth Patient Portal offered by [facility] by registering at the following website: [email or web address]. By joining [employer]’s FollowMyHealth portal, you will also be able to view your health information using other applications (apps) compatible with our system. You can access the FollowMyHealth Patient Portal offered by [facility] by registering at the following website: [email or web address]. By joining [employer]’s FollowMyHealth portal, you will also be able to view your health information using other applications (apps) compatible with our system.

## 2025-06-23 NOTE — ED PROVIDER NOTE - OBJECTIVE STATEMENT
Patient is a 89-year-old female with past medical history of CAD, hypertension, asthma, hypothyroidism, 2 stents, pacemaker, presenting today with dysuria.  Patient states that for the last 2 days she has had dysuria, and rash on her inner thigh and left chest that has been pruritic.  Patient son states that patient uses back scratcher to vigorously scratch rash causing punctate excoriations.  Patient denies any fever, LOC, chest pain, shortness of breath, abdominal pain, wheezing, diarrhea.

## 2025-06-23 NOTE — ED PROVIDER NOTE - NSFOLLOWUPINSTRUCTIONS_ED_ALL_ED_FT
Please follow up with your dermatologist.     Urinary Tract Infection, Female  The female urinary system, including the kidneys, ureters, bladder, and urethra.  A urinary tract infection (UTI) is an infection in your urinary tract. The urinary tract is made up of organs that make, store, and get rid of pee (urine) in your body. These organs include:  The kidneys.  The ureters.  The bladder.  The urethra.  What are the causes?  Most UTIs are caused by germs called bacteria. They may be in or near your genitals. These germs grow and cause swelling in your urinary tract.    What increases the risk?  You're more likely to get a UTI if:  You're a female. The urethra is shorter in females than in males.  You have a soft tube called a catheter that drains your pee.  You can't control when you pee or poop.  You have trouble peeing because of:  A kidney stone.  A urinary blockage.  A nerve condition that affects your bladder.  Not getting enough to drink.  You're sexually active.  You use a birth control inside your vagina, like spermicide.  You're pregnant.  You have low levels of the hormone estrogen in your body.  You're an older adult.  You're also more likely to get a UTI if you have other health problems. These may include:  Diabetes.  A weak immune system. Your immune system is your body's defense system.  Sickle cell disease.  Injury of the spine.  What are the signs or symptoms?  Symptoms may include:  Needing to pee right away.  Peeing small amounts often.  Pain or burning when you pee.  Blood in your pee.  Pee that smells bad or odd.  Pain in your belly or lower back.  You may also:  Feel confused. This may be the first symptom in older adults.  Vomit.  Not feel hungry.  Feel tired or easily annoyed.  Have a fever or chills.  How is this diagnosed?  A UTI is diagnosed based on your medical history and an exam. You may also have other tests. These may include:  Pee tests.  Blood tests.  Tests for sexually transmitted infections (STIs).  If you've had more than one UTI, you may need to have imaging studies done to find out why you keep getting them.    How is this treated?  A UTI can be treated by:  Taking antibiotics or other medicines.  Drinking enough fluid to keep your pee pale yellow.  In rare cases, a UTI can cause a very bad condition called sepsis. Sepsis may be treated in the hospital.    Follow these instructions at home:  Medicines    Take your medicines only as told by your health care provider.  If you were given antibiotics, take them as told by your provider. Do not stop taking them even if you start to feel better.  General instructions    Make sure you:  Pee often and fully. Do not hold your pee for a long time.  Wipe from front to back after you pee or poop. Use each tissue only once when you wipe.  Pee after you have sex.  Do not douche or use sprays or powders in your genital area.  Contact a health care provider if:  Your symptoms don't get better after 1–2 days of taking antibiotics.  Your symptoms go away and then come back.  You have a fever or chills.  You vomit or feel like you may vomit.  Get help right away if:  You have very bad pain in your back or lower belly.  You faint.  This information is not intended to replace advice given to you by your health care provider. Make sure you discuss any questions you have with your health care provider.

## 2025-06-23 NOTE — ED PROVIDER NOTE - ATTENDING CONTRIBUTION TO CARE
89 F to Ed with dysuria and rash  pt with itchy rash as well as urinary frequency  No fevers, no bowel discomfort.

## 2025-06-24 ENCOUNTER — RX RENEWAL (OUTPATIENT)
Age: 89
End: 2025-06-24

## 2025-09-04 ENCOUNTER — OUTPATIENT (OUTPATIENT)
Dept: OUTPATIENT SERVICES | Facility: HOSPITAL | Age: 89
LOS: 1 days | End: 2025-09-04
Payer: MEDICARE

## 2025-09-04 DIAGNOSIS — H26.9 UNSPECIFIED CATARACT: Chronic | ICD-10-CM

## 2025-09-04 DIAGNOSIS — H44.513 ABSOLUTE GLAUCOMA, BILATERAL: Chronic | ICD-10-CM

## 2025-09-04 DIAGNOSIS — Z86.79 PERSONAL HISTORY OF OTHER DISEASES OF THE CIRCULATORY SYSTEM: Chronic | ICD-10-CM

## 2025-09-04 DIAGNOSIS — Z90.710 ACQUIRED ABSENCE OF BOTH CERVIX AND UTERUS: Chronic | ICD-10-CM

## 2025-09-04 DIAGNOSIS — Z90.49 ACQUIRED ABSENCE OF OTHER SPECIFIED PARTS OF DIGESTIVE TRACT: Chronic | ICD-10-CM

## 2025-09-04 DIAGNOSIS — R42 DIZZINESS AND GIDDINESS: ICD-10-CM

## 2025-09-04 DIAGNOSIS — Z95.0 PRESENCE OF CARDIAC PACEMAKER: Chronic | ICD-10-CM

## 2025-09-04 PROCEDURE — 70130 X-RAY EXAM OF MASTOIDS: CPT | Mod: 26,50

## 2025-09-04 PROCEDURE — 70330 X-RAY EXAM OF JAW JOINTS: CPT

## 2025-09-04 PROCEDURE — 70130 X-RAY EXAM OF MASTOIDS: CPT | Mod: 50

## 2025-09-04 PROCEDURE — 70330 X-RAY EXAM OF JAW JOINTS: CPT | Mod: 26

## 2025-09-05 DIAGNOSIS — R42 DIZZINESS AND GIDDINESS: ICD-10-CM
